# Patient Record
Sex: FEMALE | Race: WHITE | HISPANIC OR LATINO | URBAN - METROPOLITAN AREA
[De-identification: names, ages, dates, MRNs, and addresses within clinical notes are randomized per-mention and may not be internally consistent; named-entity substitution may affect disease eponyms.]

---

## 2017-06-12 ENCOUNTER — EMERGENCY (EMERGENCY)
Facility: HOSPITAL | Age: 22
LOS: 0 days | Discharge: LEFT BEFORE DISCHARGE | End: 2017-06-12

## 2017-06-28 DIAGNOSIS — R42 DIZZINESS AND GIDDINESS: ICD-10-CM

## 2017-06-28 DIAGNOSIS — K21.9 GASTRO-ESOPHAGEAL REFLUX DISEASE WITHOUT ESOPHAGITIS: ICD-10-CM

## 2017-06-28 DIAGNOSIS — J45.909 UNSPECIFIED ASTHMA, UNCOMPLICATED: ICD-10-CM

## 2017-06-28 DIAGNOSIS — R11.0 NAUSEA: ICD-10-CM

## 2018-05-02 ENCOUNTER — EMERGENCY (EMERGENCY)
Facility: HOSPITAL | Age: 23
LOS: 0 days | Discharge: HOME | End: 2018-05-02
Attending: EMERGENCY MEDICINE | Admitting: EMERGENCY MEDICINE

## 2018-05-02 VITALS
HEART RATE: 96 BPM | RESPIRATION RATE: 20 BRPM | OXYGEN SATURATION: 100 % | DIASTOLIC BLOOD PRESSURE: 94 MMHG | SYSTOLIC BLOOD PRESSURE: 144 MMHG | TEMPERATURE: 97 F

## 2018-05-02 DIAGNOSIS — R06.02 SHORTNESS OF BREATH: ICD-10-CM

## 2018-05-02 DIAGNOSIS — J45.901 UNSPECIFIED ASTHMA WITH (ACUTE) EXACERBATION: ICD-10-CM

## 2018-05-02 DIAGNOSIS — Z91.018 ALLERGY TO OTHER FOODS: ICD-10-CM

## 2018-05-02 LAB
ANION GAP SERPL CALC-SCNC: 15 MMOL/L — HIGH (ref 7–14)
APTT BLD: 31.5 SEC — SIGNIFICANT CHANGE UP (ref 27–39.2)
BUN SERPL-MCNC: 6 MG/DL — LOW (ref 10–20)
CALCIUM SERPL-MCNC: 9.6 MG/DL — SIGNIFICANT CHANGE UP (ref 8.5–10.1)
CHLORIDE SERPL-SCNC: 101 MMOL/L — SIGNIFICANT CHANGE UP (ref 98–110)
CK MB CFR SERPL CALC: 1.1 NG/ML — SIGNIFICANT CHANGE UP (ref 0.6–6.3)
CK SERPL-CCNC: 67 U/L — SIGNIFICANT CHANGE UP (ref 0–225)
CO2 SERPL-SCNC: 24 MMOL/L — SIGNIFICANT CHANGE UP (ref 17–32)
CREAT SERPL-MCNC: 0.6 MG/DL — LOW (ref 0.7–1.5)
GLUCOSE SERPL-MCNC: 116 MG/DL — HIGH (ref 70–99)
HCT VFR BLD CALC: 37.8 % — SIGNIFICANT CHANGE UP (ref 37–47)
HGB BLD-MCNC: 12.4 G/DL — SIGNIFICANT CHANGE UP (ref 12–16)
INR BLD: 1.18 RATIO — SIGNIFICANT CHANGE UP (ref 0.65–1.3)
MCHC RBC-ENTMCNC: 28.3 PG — SIGNIFICANT CHANGE UP (ref 27–31)
MCHC RBC-ENTMCNC: 32.8 G/DL — SIGNIFICANT CHANGE UP (ref 32–37)
MCV RBC AUTO: 86.3 FL — SIGNIFICANT CHANGE UP (ref 81–99)
NRBC # BLD: 0 /100 WBCS — SIGNIFICANT CHANGE UP (ref 0–0)
PLATELET # BLD AUTO: 299 K/UL — SIGNIFICANT CHANGE UP (ref 130–400)
POTASSIUM SERPL-MCNC: 3.5 MMOL/L — SIGNIFICANT CHANGE UP (ref 3.5–5)
POTASSIUM SERPL-SCNC: 3.5 MMOL/L — SIGNIFICANT CHANGE UP (ref 3.5–5)
PROTHROM AB SERPL-ACNC: 12.8 SEC — SIGNIFICANT CHANGE UP (ref 9.95–12.87)
RBC # BLD: 4.38 M/UL — SIGNIFICANT CHANGE UP (ref 4.2–5.4)
RBC # FLD: 12.2 % — SIGNIFICANT CHANGE UP (ref 11.5–14.5)
SODIUM SERPL-SCNC: 140 MMOL/L — SIGNIFICANT CHANGE UP (ref 135–146)
TROPONIN T SERPL-MCNC: <0.01 NG/ML — SIGNIFICANT CHANGE UP
WBC # BLD: 9.33 K/UL — SIGNIFICANT CHANGE UP (ref 4.8–10.8)
WBC # FLD AUTO: 9.33 K/UL — SIGNIFICANT CHANGE UP (ref 4.8–10.8)

## 2018-05-02 RX ORDER — IPRATROPIUM/ALBUTEROL SULFATE 18-103MCG
3 AEROSOL WITH ADAPTER (GRAM) INHALATION ONCE
Qty: 0 | Refills: 0 | Status: COMPLETED | OUTPATIENT
Start: 2018-05-02 | End: 2018-05-02

## 2018-05-02 RX ORDER — ALBUTEROL 90 UG/1
1 AEROSOL, METERED ORAL ONCE
Qty: 0 | Refills: 0 | Status: DISCONTINUED | OUTPATIENT
Start: 2018-05-02 | End: 2018-05-02

## 2018-05-02 RX ORDER — ALBUTEROL 90 UG/1
1 AEROSOL, METERED ORAL
Qty: 20 | Refills: 0
Start: 2018-05-02 | End: 2018-05-08

## 2018-05-02 RX ORDER — SODIUM CHLORIDE 9 MG/ML
3 INJECTION INTRAMUSCULAR; INTRAVENOUS; SUBCUTANEOUS EVERY 8 HOURS
Qty: 0 | Refills: 0 | Status: DISCONTINUED | OUTPATIENT
Start: 2018-05-02 | End: 2018-05-02

## 2018-05-02 RX ADMIN — Medication 3 MILLILITER(S): at 11:08

## 2018-05-02 RX ADMIN — Medication 3 MILLILITER(S): at 11:30

## 2018-05-02 RX ADMIN — Medication 125 MILLIGRAM(S): at 11:09

## 2018-05-02 RX ADMIN — Medication 3 MILLILITER(S): at 11:43

## 2018-05-02 RX ADMIN — SODIUM CHLORIDE 3 MILLILITER(S): 9 INJECTION INTRAMUSCULAR; INTRAVENOUS; SUBCUTANEOUS at 11:08

## 2018-05-02 NOTE — ED PROVIDER NOTE - MEDICAL DECISION MAKING DETAILS
chart done Chart finished.  21 yo woman with asthma history no prior intiubations and one ICU admission presents after she had an asthma exacerbation in [place] 2 days ago requiring intubation and she states :"cardiac arrest" but she was extubated within one day and flew back the next day.  In ED is well appearing.  Improved with nebs and corticosteroiuds.  Labs and EKG ok.  Will provide inhaler and prednisone.  Clsoe outpatient follow up and return for any new or worsening symptoms.

## 2018-05-02 NOTE — ED PROVIDER NOTE - PROGRESS NOTE DETAILS
Lungs CTA b/l; patient feeling much better. Will discharge home. Follow-up with primary and pulmonary
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
## 2018-05-02 NOTE — ED PROVIDER NOTE - CRITICAL CARE PROVIDED
additional history taking/documentation/interpretation of diagnostic studies/direct patient care (not related to procedure)/consult w/ pt's family directly relating to pts condition

## 2018-05-02 NOTE — ED PROVIDER NOTE - OBJECTIVE STATEMENT
21 y/o female with hx Asthma presents to the ED s/p "I was visiting  and my asthma was acting up. I became SOB and had shoulder pain. I was intubated and they told me I went into Cardiac arrest. I was discharged the same day. I returned to [place] last night and I still have some chest soreness and SOB with cough." no fever/ chills/ weakness/ leg swelling

## 2018-06-20 ENCOUNTER — TRANSCRIPTION ENCOUNTER (OUTPATIENT)
Age: 23
End: 2018-06-20

## 2018-12-14 PROBLEM — Z00.00 ENCOUNTER FOR PREVENTIVE HEALTH EXAMINATION: Status: ACTIVE | Noted: 2018-12-14

## 2018-12-20 ENCOUNTER — APPOINTMENT (OUTPATIENT)
Dept: ANTEPARTUM | Facility: CLINIC | Age: 23
End: 2018-12-20

## 2019-01-12 ENCOUNTER — TRANSCRIPTION ENCOUNTER (OUTPATIENT)
Age: 24
End: 2019-01-12

## 2019-09-06 ENCOUNTER — EMERGENCY (EMERGENCY)
Facility: HOSPITAL | Age: 24
LOS: 0 days | Discharge: LEFT BEFORE DISCHARGE | End: 2019-09-06
Attending: EMERGENCY MEDICINE | Admitting: EMERGENCY MEDICINE
Payer: MEDICAID

## 2019-09-06 VITALS
HEIGHT: 65 IN | OXYGEN SATURATION: 100 % | TEMPERATURE: 97 F | RESPIRATION RATE: 18 BRPM | DIASTOLIC BLOOD PRESSURE: 83 MMHG | HEART RATE: 96 BPM | SYSTOLIC BLOOD PRESSURE: 134 MMHG | WEIGHT: 233.69 LBS

## 2019-09-06 DIAGNOSIS — F17.200 NICOTINE DEPENDENCE, UNSPECIFIED, UNCOMPLICATED: ICD-10-CM

## 2019-09-06 DIAGNOSIS — R07.89 OTHER CHEST PAIN: ICD-10-CM

## 2019-09-06 PROCEDURE — 99284 EMERGENCY DEPT VISIT MOD MDM: CPT

## 2019-09-06 PROCEDURE — 93010 ELECTROCARDIOGRAM REPORT: CPT

## 2019-09-06 NOTE — ED PROVIDER NOTE - NS ED ROS FT
Constitutional: (-) fever (-) vomiting  Eyes/ENT: (-) vision changes   Cardiovascular: (+) chest pain, (-) sob  Respiratory: (-) cough, (-) shortness of breath  Gastrointestinal: (-) vomiting, (-) diarrhea, (-) abdominal pain  : (-) dysuria   Musculoskeletal: (-) back pain  Integumentary: (-) rash, (-) edema  Neurological: (-)loc  Allergic/Immunologic: (-) pruritus  Endocrine: No history of thyroid disease or diabetes.

## 2019-09-06 NOTE — ED PROVIDER NOTE - PHYSICAL EXAMINATION
CONSTITUTIONAL: Well-developed; well-nourished; in no acute distress, speaking in full sentences  SKIN: warm, dry  HEAD: Normocephalic; atraumatic  EYES: PERRL, EOMI, no conjunctival erythema  ENT: No nasal discharge; airway clear, mucous membranes moist  NECK: Supple; non tender, FROM  CARD: +S1, S2 no murmurs, gallops, or rubs. Regular rate and rhythm. radial 2+, no chest wall tenderness  RESP: No wheezes, rales or rhonchi. CTABL  ABD: soft ntnd, no rebound, no guarding, no rigidity,   EXT: moves all extremities, ambulates wo assistance No clubbing, cyanosis or edema.   NEURO: Alert, oriented, grossly unremarkable, no focal deficits, cn ii-xii grossly intact  PSYCH: Cooperative, appropriate

## 2019-09-06 NOTE — ED ADULT NURSE NOTE - OBJECTIVE STATEMENT
pt aox4 presents to ed after waking up with chest pain this am. pt states pain resolved at this time denies complaints of discomfort. denies n/v/d. denies headache / dizziness. respirations even / unlabored. pt not currently taking any medications. Pending eval from md. Will continue to monitor / assess

## 2019-09-06 NOTE — ED ADULT NURSE NOTE - NSSUHOSCREENINGYN_ED_ALL_ED
----- Message from Curtis Potter MD sent at 5/29/2019 10:08 AM CDT -----  Urine was okay. No evidence of kidney infection or bruising of kidney.   Yes - the patient is able to be screened

## 2019-09-06 NOTE — ED PROVIDER NOTE - OBJECTIVE STATEMENT
23yo f no sig pmhx presents intermittent chest pain, lasting 1 hour at a time described as feeling like there is air on her L chest which bgan around 5am. pt states this has happened multiple times in the past, no dx. no alleviating or exacerbating factors. pain is nonradiating. no family hx cardiac disease.

## 2019-10-03 ENCOUNTER — INPATIENT (INPATIENT)
Facility: HOSPITAL | Age: 24
LOS: 3 days | Discharge: HOME | End: 2019-10-07
Attending: ORTHOPAEDIC SURGERY | Admitting: ORTHOPAEDIC SURGERY
Payer: COMMERCIAL

## 2019-10-03 ENCOUNTER — EMERGENCY (EMERGENCY)
Facility: HOSPITAL | Age: 24
LOS: 0 days | Discharge: LEFT BEFORE DISCHARGE | End: 2019-10-03
Attending: EMERGENCY MEDICINE
Payer: MEDICAID

## 2019-10-03 VITALS
DIASTOLIC BLOOD PRESSURE: 103 MMHG | TEMPERATURE: 98 F | SYSTOLIC BLOOD PRESSURE: 162 MMHG | HEIGHT: 65 IN | HEART RATE: 118 BPM | RESPIRATION RATE: 30 BRPM | OXYGEN SATURATION: 99 % | WEIGHT: 220.02 LBS

## 2019-10-03 VITALS
RESPIRATION RATE: 18 BRPM | WEIGHT: 229.94 LBS | TEMPERATURE: 98 F | SYSTOLIC BLOOD PRESSURE: 133 MMHG | OXYGEN SATURATION: 99 % | DIASTOLIC BLOOD PRESSURE: 83 MMHG | HEIGHT: 65 IN | HEART RATE: 110 BPM

## 2019-10-03 VITALS
TEMPERATURE: 98 F | DIASTOLIC BLOOD PRESSURE: 80 MMHG | SYSTOLIC BLOOD PRESSURE: 145 MMHG | OXYGEN SATURATION: 99 % | RESPIRATION RATE: 18 BRPM | HEART RATE: 102 BPM

## 2019-10-03 LAB
ALBUMIN SERPL ELPH-MCNC: 4.4 G/DL — SIGNIFICANT CHANGE UP (ref 3.5–5.2)
ALBUMIN SERPL ELPH-MCNC: 5 G/DL — SIGNIFICANT CHANGE UP (ref 3.5–5.2)
ALP SERPL-CCNC: 102 U/L — SIGNIFICANT CHANGE UP (ref 30–115)
ALP SERPL-CCNC: 109 U/L — SIGNIFICANT CHANGE UP (ref 30–115)
ALT FLD-CCNC: 23 U/L — SIGNIFICANT CHANGE UP (ref 0–41)
ALT FLD-CCNC: 28 U/L — SIGNIFICANT CHANGE UP (ref 0–41)
ANION GAP SERPL CALC-SCNC: 12 MMOL/L — SIGNIFICANT CHANGE UP (ref 7–14)
ANION GAP SERPL CALC-SCNC: 16 MMOL/L — HIGH (ref 7–14)
APPEARANCE UR: CLEAR — SIGNIFICANT CHANGE UP
APTT BLD: 36 SEC — SIGNIFICANT CHANGE UP (ref 27–39.2)
AST SERPL-CCNC: 20 U/L — SIGNIFICANT CHANGE UP (ref 0–41)
AST SERPL-CCNC: 28 U/L — SIGNIFICANT CHANGE UP (ref 0–41)
BASOPHILS # BLD AUTO: 0.03 K/UL — SIGNIFICANT CHANGE UP (ref 0–0.2)
BASOPHILS # BLD AUTO: 0.06 K/UL — SIGNIFICANT CHANGE UP (ref 0–0.2)
BASOPHILS NFR BLD AUTO: 0.2 % — SIGNIFICANT CHANGE UP (ref 0–1)
BASOPHILS NFR BLD AUTO: 0.4 % — SIGNIFICANT CHANGE UP (ref 0–1)
BILIRUB DIRECT SERPL-MCNC: <0.2 MG/DL — SIGNIFICANT CHANGE UP (ref 0–0.2)
BILIRUB INDIRECT FLD-MCNC: SIGNIFICANT CHANGE UP MG/DL (ref 0.2–1.2)
BILIRUB SERPL-MCNC: <0.2 MG/DL — SIGNIFICANT CHANGE UP (ref 0.2–1.2)
BILIRUB SERPL-MCNC: <0.2 MG/DL — SIGNIFICANT CHANGE UP (ref 0.2–1.2)
BILIRUB UR-MCNC: NEGATIVE — SIGNIFICANT CHANGE UP
BLD GP AB SCN SERPL QL: SIGNIFICANT CHANGE UP
BUN SERPL-MCNC: 15 MG/DL — SIGNIFICANT CHANGE UP (ref 10–20)
BUN SERPL-MCNC: 9 MG/DL — LOW (ref 10–20)
CALCIUM SERPL-MCNC: 10.5 MG/DL — HIGH (ref 8.5–10.1)
CALCIUM SERPL-MCNC: 9.9 MG/DL — SIGNIFICANT CHANGE UP (ref 8.5–10.1)
CHLORIDE SERPL-SCNC: 100 MMOL/L — SIGNIFICANT CHANGE UP (ref 98–110)
CHLORIDE SERPL-SCNC: 99 MMOL/L — SIGNIFICANT CHANGE UP (ref 98–110)
CO2 SERPL-SCNC: 23 MMOL/L — SIGNIFICANT CHANGE UP (ref 17–32)
CO2 SERPL-SCNC: 24 MMOL/L — SIGNIFICANT CHANGE UP (ref 17–32)
COLOR SPEC: SIGNIFICANT CHANGE UP
CREAT SERPL-MCNC: 0.5 MG/DL — LOW (ref 0.7–1.5)
CREAT SERPL-MCNC: 0.8 MG/DL — SIGNIFICANT CHANGE UP (ref 0.7–1.5)
DIFF PNL FLD: NEGATIVE — SIGNIFICANT CHANGE UP
EOSINOPHIL # BLD AUTO: 0.21 K/UL — SIGNIFICANT CHANGE UP (ref 0–0.7)
EOSINOPHIL # BLD AUTO: 0.24 K/UL — SIGNIFICANT CHANGE UP (ref 0–0.7)
EOSINOPHIL NFR BLD AUTO: 1.4 % — SIGNIFICANT CHANGE UP (ref 0–8)
EOSINOPHIL NFR BLD AUTO: 1.6 % — SIGNIFICANT CHANGE UP (ref 0–8)
ETHANOL SERPL-MCNC: <10 MG/DL — SIGNIFICANT CHANGE UP
GLUCOSE SERPL-MCNC: 142 MG/DL — HIGH (ref 70–99)
GLUCOSE SERPL-MCNC: 154 MG/DL — HIGH (ref 70–99)
GLUCOSE UR QL: NEGATIVE — SIGNIFICANT CHANGE UP
HCG SERPL QL: NEGATIVE — SIGNIFICANT CHANGE UP
HCT VFR BLD CALC: 39.3 % — SIGNIFICANT CHANGE UP (ref 37–47)
HCT VFR BLD CALC: 41.2 % — SIGNIFICANT CHANGE UP (ref 37–47)
HGB BLD-MCNC: 12.6 G/DL — SIGNIFICANT CHANGE UP (ref 12–16)
HGB BLD-MCNC: 13.1 G/DL — SIGNIFICANT CHANGE UP (ref 12–16)
IMM GRANULOCYTES NFR BLD AUTO: 0.5 % — HIGH (ref 0.1–0.3)
IMM GRANULOCYTES NFR BLD AUTO: 0.7 % — HIGH (ref 0.1–0.3)
INR BLD: 0.96 RATIO — SIGNIFICANT CHANGE UP (ref 0.65–1.3)
KETONES UR-MCNC: NEGATIVE — SIGNIFICANT CHANGE UP
LACTATE SERPL-SCNC: 3.2 MMOL/L — HIGH (ref 0.5–2.2)
LEUKOCYTE ESTERASE UR-ACNC: NEGATIVE — SIGNIFICANT CHANGE UP
LIDOCAIN IGE QN: 33 U/L — SIGNIFICANT CHANGE UP (ref 7–60)
LIDOCAIN IGE QN: 40 U/L — SIGNIFICANT CHANGE UP (ref 7–60)
LYMPHOCYTES # BLD AUTO: 2.94 K/UL — SIGNIFICANT CHANGE UP (ref 1.2–3.4)
LYMPHOCYTES # BLD AUTO: 21.8 % — SIGNIFICANT CHANGE UP (ref 20.5–51.1)
LYMPHOCYTES # BLD AUTO: 30 % — SIGNIFICANT CHANGE UP (ref 20.5–51.1)
LYMPHOCYTES # BLD AUTO: 4.97 K/UL — HIGH (ref 1.2–3.4)
MCHC RBC-ENTMCNC: 26.5 PG — LOW (ref 27–31)
MCHC RBC-ENTMCNC: 26.6 PG — LOW (ref 27–31)
MCHC RBC-ENTMCNC: 31.8 G/DL — LOW (ref 32–37)
MCHC RBC-ENTMCNC: 32.1 G/DL — SIGNIFICANT CHANGE UP (ref 32–37)
MCV RBC AUTO: 82.9 FL — SIGNIFICANT CHANGE UP (ref 81–99)
MCV RBC AUTO: 83.2 FL — SIGNIFICANT CHANGE UP (ref 81–99)
MONOCYTES # BLD AUTO: 0.68 K/UL — HIGH (ref 0.1–0.6)
MONOCYTES # BLD AUTO: 0.85 K/UL — HIGH (ref 0.1–0.6)
MONOCYTES NFR BLD AUTO: 5 % — SIGNIFICANT CHANGE UP (ref 1.7–9.3)
MONOCYTES NFR BLD AUTO: 5.1 % — SIGNIFICANT CHANGE UP (ref 1.7–9.3)
NEUTROPHILS # BLD AUTO: 10.36 K/UL — HIGH (ref 1.4–6.5)
NEUTROPHILS # BLD AUTO: 9.58 K/UL — HIGH (ref 1.4–6.5)
NEUTROPHILS NFR BLD AUTO: 62.4 % — SIGNIFICANT CHANGE UP (ref 42.2–75.2)
NEUTROPHILS NFR BLD AUTO: 70.9 % — SIGNIFICANT CHANGE UP (ref 42.2–75.2)
NITRITE UR-MCNC: NEGATIVE — SIGNIFICANT CHANGE UP
NRBC # BLD: 0 /100 WBCS — SIGNIFICANT CHANGE UP (ref 0–0)
NRBC # BLD: 0 /100 WBCS — SIGNIFICANT CHANGE UP (ref 0–0)
PH UR: 6 — SIGNIFICANT CHANGE UP (ref 5–8)
PLATELET # BLD AUTO: 400 K/UL — SIGNIFICANT CHANGE UP (ref 130–400)
PLATELET # BLD AUTO: 493 K/UL — HIGH (ref 130–400)
POTASSIUM SERPL-MCNC: 4.5 MMOL/L — SIGNIFICANT CHANGE UP (ref 3.5–5)
POTASSIUM SERPL-MCNC: 4.6 MMOL/L — SIGNIFICANT CHANGE UP (ref 3.5–5)
POTASSIUM SERPL-SCNC: 4.5 MMOL/L — SIGNIFICANT CHANGE UP (ref 3.5–5)
POTASSIUM SERPL-SCNC: 4.6 MMOL/L — SIGNIFICANT CHANGE UP (ref 3.5–5)
PROT SERPL-MCNC: 7.4 G/DL — SIGNIFICANT CHANGE UP (ref 6–8)
PROT SERPL-MCNC: 8.3 G/DL — HIGH (ref 6–8)
PROT UR-MCNC: NEGATIVE — SIGNIFICANT CHANGE UP
PROTHROM AB SERPL-ACNC: 11 SEC — SIGNIFICANT CHANGE UP (ref 9.95–12.87)
RBC # BLD: 4.74 M/UL — SIGNIFICANT CHANGE UP (ref 4.2–5.4)
RBC # BLD: 4.95 M/UL — SIGNIFICANT CHANGE UP (ref 4.2–5.4)
RBC # FLD: 12.7 % — SIGNIFICANT CHANGE UP (ref 11.5–14.5)
RBC # FLD: 12.7 % — SIGNIFICANT CHANGE UP (ref 11.5–14.5)
SODIUM SERPL-SCNC: 135 MMOL/L — SIGNIFICANT CHANGE UP (ref 135–146)
SODIUM SERPL-SCNC: 139 MMOL/L — SIGNIFICANT CHANGE UP (ref 135–146)
SP GR SPEC: 1.01 — SIGNIFICANT CHANGE UP (ref 1.01–1.02)
UROBILINOGEN FLD QL: SIGNIFICANT CHANGE UP
WBC # BLD: 13.51 K/UL — HIGH (ref 4.8–10.8)
WBC # BLD: 16.59 K/UL — HIGH (ref 4.8–10.8)
WBC # FLD AUTO: 13.51 K/UL — HIGH (ref 4.8–10.8)
WBC # FLD AUTO: 16.59 K/UL — HIGH (ref 4.8–10.8)

## 2019-10-03 PROCEDURE — 73630 X-RAY EXAM OF FOOT: CPT | Mod: 26,RT

## 2019-10-03 PROCEDURE — 71045 X-RAY EXAM CHEST 1 VIEW: CPT | Mod: 26

## 2019-10-03 PROCEDURE — 99222 1ST HOSP IP/OBS MODERATE 55: CPT

## 2019-10-03 PROCEDURE — 93010 ELECTROCARDIOGRAM REPORT: CPT | Mod: 76

## 2019-10-03 PROCEDURE — 73610 X-RAY EXAM OF ANKLE: CPT | Mod: 26,RT

## 2019-10-03 PROCEDURE — 73590 X-RAY EXAM OF LOWER LEG: CPT | Mod: 26,RT

## 2019-10-03 PROCEDURE — 72170 X-RAY EXAM OF PELVIS: CPT | Mod: 26

## 2019-10-03 PROCEDURE — 93010 ELECTROCARDIOGRAM REPORT: CPT

## 2019-10-03 PROCEDURE — 73700 CT LOWER EXTREMITY W/O DYE: CPT | Mod: 26,RT

## 2019-10-03 PROCEDURE — 99291 CRITICAL CARE FIRST HOUR: CPT

## 2019-10-03 PROCEDURE — 76830 TRANSVAGINAL US NON-OB: CPT | Mod: 26

## 2019-10-03 PROCEDURE — 99284 EMERGENCY DEPT VISIT MOD MDM: CPT

## 2019-10-03 RX ORDER — HEPARIN SODIUM 5000 [USP'U]/ML
5000 INJECTION INTRAVENOUS; SUBCUTANEOUS EVERY 8 HOURS
Refills: 0 | Status: DISCONTINUED | OUTPATIENT
Start: 2019-10-03 | End: 2019-10-04

## 2019-10-03 RX ORDER — OXYCODONE HYDROCHLORIDE 5 MG/1
5 TABLET ORAL EVERY 4 HOURS
Refills: 0 | Status: DISCONTINUED | OUTPATIENT
Start: 2019-10-03 | End: 2019-10-04

## 2019-10-03 RX ORDER — MORPHINE SULFATE 50 MG/1
2 CAPSULE, EXTENDED RELEASE ORAL EVERY 4 HOURS
Refills: 0 | Status: DISCONTINUED | OUTPATIENT
Start: 2019-10-03 | End: 2019-10-04

## 2019-10-03 RX ORDER — HYDROMORPHONE HYDROCHLORIDE 2 MG/ML
1 INJECTION INTRAMUSCULAR; INTRAVENOUS; SUBCUTANEOUS ONCE
Refills: 0 | Status: DISCONTINUED | OUTPATIENT
Start: 2019-10-03 | End: 2019-10-03

## 2019-10-03 RX ORDER — PANTOPRAZOLE SODIUM 20 MG/1
40 TABLET, DELAYED RELEASE ORAL
Refills: 0 | Status: DISCONTINUED | OUTPATIENT
Start: 2019-10-03 | End: 2019-10-04

## 2019-10-03 RX ORDER — MORPHINE SULFATE 50 MG/1
6 CAPSULE, EXTENDED RELEASE ORAL ONCE
Refills: 0 | Status: DISCONTINUED | OUTPATIENT
Start: 2019-10-03 | End: 2019-10-03

## 2019-10-03 RX ORDER — MORPHINE SULFATE 50 MG/1
2 CAPSULE, EXTENDED RELEASE ORAL ONCE
Refills: 0 | Status: DISCONTINUED | OUTPATIENT
Start: 2019-10-03 | End: 2019-10-03

## 2019-10-03 RX ORDER — KETOROLAC TROMETHAMINE 30 MG/ML
30 SYRINGE (ML) INJECTION EVERY 6 HOURS
Refills: 0 | Status: DISCONTINUED | OUTPATIENT
Start: 2019-10-03 | End: 2019-10-04

## 2019-10-03 RX ORDER — SODIUM CHLORIDE 9 MG/ML
1000 INJECTION, SOLUTION INTRAVENOUS ONCE
Refills: 0 | Status: COMPLETED | OUTPATIENT
Start: 2019-10-03 | End: 2019-10-03

## 2019-10-03 RX ORDER — SODIUM CHLORIDE 9 MG/ML
1000 INJECTION, SOLUTION INTRAVENOUS
Refills: 0 | Status: DISCONTINUED | OUTPATIENT
Start: 2019-10-03 | End: 2019-10-04

## 2019-10-03 RX ORDER — TETANUS TOXOID, REDUCED DIPHTHERIA TOXOID AND ACELLULAR PERTUSSIS VACCINE, ADSORBED 5; 2.5; 8; 8; 2.5 [IU]/.5ML; [IU]/.5ML; UG/.5ML; UG/.5ML; UG/.5ML
0.5 SUSPENSION INTRAMUSCULAR ONCE
Refills: 0 | Status: COMPLETED | OUTPATIENT
Start: 2019-10-03 | End: 2019-10-03

## 2019-10-03 RX ORDER — AMPICILLIN SODIUM AND SULBACTAM SODIUM 250; 125 MG/ML; MG/ML
3 INJECTION, POWDER, FOR SUSPENSION INTRAMUSCULAR; INTRAVENOUS ONCE
Refills: 0 | Status: COMPLETED | OUTPATIENT
Start: 2019-10-03 | End: 2019-10-03

## 2019-10-03 RX ORDER — ONDANSETRON 8 MG/1
4 TABLET, FILM COATED ORAL ONCE
Refills: 0 | Status: COMPLETED | OUTPATIENT
Start: 2019-10-03 | End: 2019-10-03

## 2019-10-03 RX ORDER — SODIUM CHLORIDE 9 MG/ML
1000 INJECTION INTRAMUSCULAR; INTRAVENOUS; SUBCUTANEOUS ONCE
Refills: 0 | Status: COMPLETED | OUTPATIENT
Start: 2019-10-03 | End: 2019-10-03

## 2019-10-03 RX ORDER — CEFAZOLIN SODIUM 1 G
2000 VIAL (EA) INJECTION EVERY 8 HOURS
Refills: 0 | Status: DISCONTINUED | OUTPATIENT
Start: 2019-10-03 | End: 2019-10-04

## 2019-10-03 RX ORDER — CHLORHEXIDINE GLUCONATE 213 G/1000ML
1 SOLUTION TOPICAL
Refills: 0 | Status: DISCONTINUED | OUTPATIENT
Start: 2019-10-03 | End: 2019-10-04

## 2019-10-03 RX ADMIN — AMPICILLIN SODIUM AND SULBACTAM SODIUM 3 GRAM(S): 250; 125 INJECTION, POWDER, FOR SUSPENSION INTRAMUSCULAR; INTRAVENOUS at 21:15

## 2019-10-03 RX ADMIN — HYDROMORPHONE HYDROCHLORIDE 1 MILLIGRAM(S): 2 INJECTION INTRAMUSCULAR; INTRAVENOUS; SUBCUTANEOUS at 22:17

## 2019-10-03 RX ADMIN — MORPHINE SULFATE 6 MILLIGRAM(S): 50 CAPSULE, EXTENDED RELEASE ORAL at 18:43

## 2019-10-03 RX ADMIN — HYDROMORPHONE HYDROCHLORIDE 1 MILLIGRAM(S): 2 INJECTION INTRAMUSCULAR; INTRAVENOUS; SUBCUTANEOUS at 21:25

## 2019-10-03 RX ADMIN — MORPHINE SULFATE 6 MILLIGRAM(S): 50 CAPSULE, EXTENDED RELEASE ORAL at 20:15

## 2019-10-03 RX ADMIN — HYDROMORPHONE HYDROCHLORIDE 1 MILLIGRAM(S): 2 INJECTION INTRAMUSCULAR; INTRAVENOUS; SUBCUTANEOUS at 22:50

## 2019-10-03 RX ADMIN — MORPHINE SULFATE 2 MILLIGRAM(S): 50 CAPSULE, EXTENDED RELEASE ORAL at 19:02

## 2019-10-03 RX ADMIN — SODIUM CHLORIDE 1000 MILLILITER(S): 9 INJECTION, SOLUTION INTRAVENOUS at 22:30

## 2019-10-03 RX ADMIN — SODIUM CHLORIDE 1000 MILLILITER(S): 9 INJECTION INTRAMUSCULAR; INTRAVENOUS; SUBCUTANEOUS at 14:34

## 2019-10-03 RX ADMIN — AMPICILLIN SODIUM AND SULBACTAM SODIUM 200 GRAM(S): 250; 125 INJECTION, POWDER, FOR SUSPENSION INTRAMUSCULAR; INTRAVENOUS at 20:07

## 2019-10-03 RX ADMIN — ONDANSETRON 4 MILLIGRAM(S): 8 TABLET, FILM COATED ORAL at 22:40

## 2019-10-03 NOTE — ED ADULT NURSE NOTE - NSIMPLEMENTINTERV_GEN_ALL_ED
Implemented All Fall Risk Interventions:  [place] to call system. Call bell, personal items and telephone within reach. Instruct patient to call for assistance. Room bathroom lighting operational. Non-slip footwear when patient is off stretcher. Physically safe environment: no spills, clutter or unnecessary equipment. Stretcher in lowest position, wheels locked, appropriate side rails in place. Provide visual cue, wrist band, yellow gown, etc. Monitor gait and stability. Monitor for mental status changes and reorient to person, place, and time. Review medications for side effects contributing to fall risk. Reinforce activity limits and safety measures with patient and family.
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
## 2019-10-03 NOTE — ED PROVIDER NOTE - NS ED ROS FT
General: No fever, chills, or weakness.  Eyes:  No visual changes, eye pain or discharge.  ENMT:  No hearing changes, pain, no sore throat or runny nose, no difficulty swallowing  Cardiac:  No chest pain, SOB or edema. No chest pain with exertion.  Respiratory:  No cough or respiratory distress. No hemoptysis. No history of asthma or RAD.  GI:  No nausea, vomiting, diarrhea or abdominal pain.  :  No dysuria, frequency or burning.  MS:  right foot injury medial foot. pain right foot.  Neuro:  numbness/parasthesias right foot. no weakness right foot.  Skin:  Bleeding right foot. no other rashes.
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
## 2019-10-03 NOTE — ED PROVIDER NOTE - CLINICAL SUMMARY MEDICAL DECISION MAKING FREE TEXT BOX
pt presented to the ED stating she was 2 weeks late for her menstrual period and she was having over one week of pelvic cramping.  pt with occasional nausea and vomiting.  pt reported that last week she had syncopal episode after feeling weak.  It occurred sitting down, no injury, no symptoms since, no cp, no sob.  WOrk up negative for anything acute.  abd soft, nontender, benign.  Pt had eloped from the ED prior to getting results and prior to being re evaluated.  I had called the pt on the phone and she reported that she had to leave the ED for personal reason.  Pt reported on the phone that she felt much better and she was going to follow up with gyn as outpatient.  pt left / eloped prior to getting any dc instructions and prior to being able to be re evaluated by me.

## 2019-10-03 NOTE — ED PROVIDER NOTE - ATTENDING CONTRIBUTION TO CARE
23 yo f presents with being 2 weeks late for menstrual period and complains of 1.5 weeks of pelvic cramping.  no other abd pain.  occasional nausea/vomiting.  no fevers, no chills, no back pain, no cp, no sob, no headache.  pt reports last week she was feeling weak and had n/v and had syncopal episode while sitting down.  no other syncope.  no other symptoms.    awake, alert.  neck supple.  abd soft, nontender.  NO RLQ tenderness.  no ruq tenderness.  mmm.  pt breathing comfortably.  eomi.  pt moving all 4 ext.  motor/sensation intact in all 4 ext.    p:  labs, ekg, supportive care, sono

## 2019-10-03 NOTE — H&P ADULT - NSHPPHYSICALEXAM_GEN_ALL_CORE
Physical Exam:  PHYSICAL EXAM:  General: NAD  Cardiac: RRR S1, S2, no Murmurs, rubs or gallops  Respiratory: CTAB, normal respiratory effort, breath sounds equal BL, no wheeze, rhonchi or crackles  Abdomen: Soft, non-distended, non-tender, no rebound, no guarding. +BS.  Musculoskeletal:  RLE: + pokehole over medial calcaneus, Large swelling and ecchymosis of heel, no obvious skin tenting  Compartments soft and compressible.

## 2019-10-03 NOTE — ED PROVIDER NOTE - CRITICAL CARE PROVIDED
consult w/ pt's family directly relating to pts condition/documentation/interpretation of diagnostic studies/direct patient care (not related to procedure)/additional history taking/consultation with other physicians
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
## 2019-10-03 NOTE — ED PROVIDER NOTE - PHYSICAL EXAMINATION
Constitutional: Well developed, well nourished. NAD. Good general hygiene  Head: Atraumatic.  Eyes: PERRLA. EOMI without discomfort.   ENT: No nasal discharge. Mucous membranes moist.  Neck: Supple. Painless ROM.  Cardiovascular: Regular rhythm. Regular rate. Normal S1 and S2. No murmurs. 2+ pulses in all extremities.   Pulmonary: Normal respiratory rate and effort. Lungs clear to auscultation bilaterally. No wheezing, rales, or rhonchi. Bilateral, equal lung expansion.   Abdominal: Soft. Nondistended. Nontender. No rebound or guarding.   Extremities: Obvious deformity of right foot. Right medial calcaneous puncture wound.   Skin: Active slow bleeding from wound. Some white colored drainage from wound.  Neuro: AAOx3. Sensation reduced right medial foot compared to left.

## 2019-10-03 NOTE — H&P ADULT - NSHPLABSRESULTS_GEN_ALL_CORE
LAB/STUDIES:             13.1   16.59 )-----------( 493      ( 03 Oct 2019 18:44 )             41.2     10    139  |  100  |  15  ----------------------------<  154<H>  4.5   |  23  |  0.8    Ca    10.5<H>      03 Oct 2019 18:44    TPro  8.3<H>  /  Alb  5.0  /  TBili  <0.2  /  DBili  x   /  AST  28  /  ALT  28  /  AlkPhos  109  10-03    PT/INR - ( 03 Oct 2019 18:44 )   PT: 11.00 sec;   INR: 0.96 ratio      PTT - ( 03 Oct 2019 18:44 )  PTT:36.0 sec  LIVER FUNCTIONS - ( 03 Oct 2019 18:44 )  Alb: 5.0 g/dL / Pro: 8.3 g/dL / ALK PHOS: 109 U/L / ALT: 28 U/L / AST: 28 U/L / GGT: x           Urinalysis Basic - ( 03 Oct 2019 14:02 )    Color: Light Yellow / Appearance: Clear / S.012 / pH: x  Gluc: x / Ketone: Negative  / Bili: Negative / Urobili: <2 mg/dL   Blood: x / Protein: Negative / Nitrite: Negative   Leuk Esterase: Negative / RBC: x / WBC x   Sq Epi: x / Non Sq Epi: x / Bacteria: x    IMAGING:   right foot XR: comminuted tongue type calcaneus fracture.

## 2019-10-03 NOTE — ED PROVIDER NOTE - OBJECTIVE STATEMENT
25 yo F A1, presents for evaluation of pelvic pain, onset today, with no associated symptoms. Pt states that she has not had her LMP for two weeks, it is late, last time she had it was in . NO fever, no chills, no headache, no nausea, no vomiting, no chest pain, no back pain, no vaginal bleeding, no vaginal discharge. Pt states that she was worried she missed her period and could be pregnant. PT denies any other symptoms.
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
## 2019-10-03 NOTE — ED PROVIDER NOTE - PROGRESS NOTE DETAILS
Pt in no distress, abd soft non tender Pt eloped to the ED pt ella.  I called pt .  pt told me she had to leave the ED for personal reason.  pt's IV had already been removed prior to her leaving.  pt was feeling better.  pt aware of follow up instructions to follow up with pmd and gyn

## 2019-10-03 NOTE — ED ADULT NURSE NOTE - OBJECTIVE STATEMENT
Pt presented with c/o lower abd discomfort. States she is two weeks late on her period. Endorses lower abd cramping, n/v. Alert and oriented x3. No obvious deformities or trauma noted.

## 2019-10-03 NOTE — CONSULT NOTE ADULT - ATTENDING COMMENTS
Pt seen and examined.  Agree with resident exam and plan.  -washout in ED, IV abx, splint, NWB RLE, elevate  -pt needs CT scan R calcaneus to eval for surgical planning.  -will monitor soft tissues  -NPO after MN in case urgent management required.

## 2019-10-04 DIAGNOSIS — Z02.9 ENCOUNTER FOR ADMINISTRATIVE EXAMINATIONS, UNSPECIFIED: ICD-10-CM

## 2019-10-04 LAB
ANION GAP SERPL CALC-SCNC: 12 MMOL/L — SIGNIFICANT CHANGE UP (ref 7–14)
ANION GAP SERPL CALC-SCNC: 15 MMOL/L — HIGH (ref 7–14)
APPEARANCE UR: CLEAR — SIGNIFICANT CHANGE UP
APTT BLD: 40.1 SEC — HIGH (ref 27–39.2)
BILIRUB UR-MCNC: NEGATIVE — SIGNIFICANT CHANGE UP
BUN SERPL-MCNC: 10 MG/DL — SIGNIFICANT CHANGE UP (ref 10–20)
BUN SERPL-MCNC: 8 MG/DL — LOW (ref 10–20)
CALCIUM SERPL-MCNC: 9.6 MG/DL — SIGNIFICANT CHANGE UP (ref 8.5–10.1)
CALCIUM SERPL-MCNC: 9.7 MG/DL — SIGNIFICANT CHANGE UP (ref 8.5–10.1)
CHLORIDE SERPL-SCNC: 102 MMOL/L — SIGNIFICANT CHANGE UP (ref 98–110)
CHLORIDE SERPL-SCNC: 104 MMOL/L — SIGNIFICANT CHANGE UP (ref 98–110)
CO2 SERPL-SCNC: 22 MMOL/L — SIGNIFICANT CHANGE UP (ref 17–32)
CO2 SERPL-SCNC: 25 MMOL/L — SIGNIFICANT CHANGE UP (ref 17–32)
COLOR SPEC: COLORLESS — SIGNIFICANT CHANGE UP
CREAT SERPL-MCNC: 0.5 MG/DL — LOW (ref 0.7–1.5)
CREAT SERPL-MCNC: 0.6 MG/DL — LOW (ref 0.7–1.5)
DIFF PNL FLD: NEGATIVE — SIGNIFICANT CHANGE UP
GLUCOSE SERPL-MCNC: 121 MG/DL — HIGH (ref 70–99)
GLUCOSE SERPL-MCNC: 158 MG/DL — HIGH (ref 70–99)
GLUCOSE UR QL: NEGATIVE — SIGNIFICANT CHANGE UP
HCG UR QL: NEGATIVE — SIGNIFICANT CHANGE UP
HCT VFR BLD CALC: 35.7 % — LOW (ref 37–47)
HCT VFR BLD CALC: 37.5 % — SIGNIFICANT CHANGE UP (ref 37–47)
HGB BLD-MCNC: 11.4 G/DL — LOW (ref 12–16)
HGB BLD-MCNC: 11.9 G/DL — LOW (ref 12–16)
INR BLD: 1.04 RATIO — SIGNIFICANT CHANGE UP (ref 0.65–1.3)
KETONES UR-MCNC: SIGNIFICANT CHANGE UP
LEUKOCYTE ESTERASE UR-ACNC: NEGATIVE — SIGNIFICANT CHANGE UP
MAGNESIUM SERPL-MCNC: 2 MG/DL — SIGNIFICANT CHANGE UP (ref 1.8–2.4)
MAGNESIUM SERPL-MCNC: 2 MG/DL — SIGNIFICANT CHANGE UP (ref 1.8–2.4)
MCHC RBC-ENTMCNC: 26.6 PG — LOW (ref 27–31)
MCHC RBC-ENTMCNC: 26.7 PG — LOW (ref 27–31)
MCHC RBC-ENTMCNC: 31.7 G/DL — LOW (ref 32–37)
MCHC RBC-ENTMCNC: 31.9 G/DL — LOW (ref 32–37)
MCV RBC AUTO: 83.6 FL — SIGNIFICANT CHANGE UP (ref 81–99)
MCV RBC AUTO: 83.7 FL — SIGNIFICANT CHANGE UP (ref 81–99)
NITRITE UR-MCNC: NEGATIVE — SIGNIFICANT CHANGE UP
NRBC # BLD: 0 /100 WBCS — SIGNIFICANT CHANGE UP (ref 0–0)
NRBC # BLD: 0 /100 WBCS — SIGNIFICANT CHANGE UP (ref 0–0)
PH UR: 6.5 — SIGNIFICANT CHANGE UP (ref 5–8)
PHOSPHATE SERPL-MCNC: 3 MG/DL — SIGNIFICANT CHANGE UP (ref 2.1–4.9)
PHOSPHATE SERPL-MCNC: 4.2 MG/DL — SIGNIFICANT CHANGE UP (ref 2.1–4.9)
PLATELET # BLD AUTO: 378 K/UL — SIGNIFICANT CHANGE UP (ref 130–400)
PLATELET # BLD AUTO: 407 K/UL — HIGH (ref 130–400)
POTASSIUM SERPL-MCNC: 4.4 MMOL/L — SIGNIFICANT CHANGE UP (ref 3.5–5)
POTASSIUM SERPL-MCNC: 4.5 MMOL/L — SIGNIFICANT CHANGE UP (ref 3.5–5)
POTASSIUM SERPL-SCNC: 4.4 MMOL/L — SIGNIFICANT CHANGE UP (ref 3.5–5)
POTASSIUM SERPL-SCNC: 4.5 MMOL/L — SIGNIFICANT CHANGE UP (ref 3.5–5)
PROT UR-MCNC: NEGATIVE — SIGNIFICANT CHANGE UP
PROTHROM AB SERPL-ACNC: 11.9 SEC — SIGNIFICANT CHANGE UP (ref 9.95–12.87)
RBC # BLD: 4.27 M/UL — SIGNIFICANT CHANGE UP (ref 4.2–5.4)
RBC # BLD: 4.48 M/UL — SIGNIFICANT CHANGE UP (ref 4.2–5.4)
RBC # FLD: 13 % — SIGNIFICANT CHANGE UP (ref 11.5–14.5)
RBC # FLD: 13 % — SIGNIFICANT CHANGE UP (ref 11.5–14.5)
SODIUM SERPL-SCNC: 139 MMOL/L — SIGNIFICANT CHANGE UP (ref 135–146)
SODIUM SERPL-SCNC: 141 MMOL/L — SIGNIFICANT CHANGE UP (ref 135–146)
SP GR SPEC: 1.01 — SIGNIFICANT CHANGE UP (ref 1.01–1.02)
UROBILINOGEN FLD QL: SIGNIFICANT CHANGE UP
WBC # BLD: 16.08 K/UL — HIGH (ref 4.8–10.8)
WBC # BLD: 19.9 K/UL — HIGH (ref 4.8–10.8)
WBC # FLD AUTO: 16.08 K/UL — HIGH (ref 4.8–10.8)
WBC # FLD AUTO: 19.9 K/UL — HIGH (ref 4.8–10.8)

## 2019-10-04 PROCEDURE — 99232 SBSQ HOSP IP/OBS MODERATE 35: CPT

## 2019-10-04 PROCEDURE — 93010 ELECTROCARDIOGRAM REPORT: CPT

## 2019-10-04 PROCEDURE — 93010 ELECTROCARDIOGRAM REPORT: CPT | Mod: 77

## 2019-10-04 RX ORDER — SENNA PLUS 8.6 MG/1
2 TABLET ORAL AT BEDTIME
Refills: 0 | Status: DISCONTINUED | OUTPATIENT
Start: 2019-10-04 | End: 2019-10-07

## 2019-10-04 RX ORDER — DOCUSATE SODIUM 100 MG
100 CAPSULE ORAL
Refills: 0 | Status: DISCONTINUED | OUTPATIENT
Start: 2019-10-04 | End: 2019-10-07

## 2019-10-04 RX ORDER — SODIUM CHLORIDE 9 MG/ML
1000 INJECTION INTRAMUSCULAR; INTRAVENOUS; SUBCUTANEOUS
Refills: 0 | Status: DISCONTINUED | OUTPATIENT
Start: 2019-10-04 | End: 2019-10-06

## 2019-10-04 RX ORDER — MORPHINE SULFATE 50 MG/1
4 CAPSULE, EXTENDED RELEASE ORAL EVERY 4 HOURS
Refills: 0 | Status: DISCONTINUED | OUTPATIENT
Start: 2019-10-04 | End: 2019-10-04

## 2019-10-04 RX ORDER — ENOXAPARIN SODIUM 100 MG/ML
30 INJECTION SUBCUTANEOUS
Refills: 0 | Status: DISCONTINUED | OUTPATIENT
Start: 2019-10-04 | End: 2019-10-07

## 2019-10-04 RX ORDER — ACETAMINOPHEN 500 MG
650 TABLET ORAL EVERY 6 HOURS
Refills: 0 | Status: DISCONTINUED | OUTPATIENT
Start: 2019-10-04 | End: 2019-10-07

## 2019-10-04 RX ORDER — IPRATROPIUM/ALBUTEROL SULFATE 18-103MCG
3 AEROSOL WITH ADAPTER (GRAM) INHALATION EVERY 6 HOURS
Refills: 0 | Status: DISCONTINUED | OUTPATIENT
Start: 2019-10-04 | End: 2019-10-07

## 2019-10-04 RX ORDER — OXYCODONE HYDROCHLORIDE 5 MG/1
10 TABLET ORAL EVERY 4 HOURS
Refills: 0 | Status: DISCONTINUED | OUTPATIENT
Start: 2019-10-04 | End: 2019-10-07

## 2019-10-04 RX ORDER — HYDROMORPHONE HYDROCHLORIDE 2 MG/ML
0.5 INJECTION INTRAMUSCULAR; INTRAVENOUS; SUBCUTANEOUS
Refills: 0 | Status: DISCONTINUED | OUTPATIENT
Start: 2019-10-04 | End: 2019-10-04

## 2019-10-04 RX ORDER — CEFAZOLIN SODIUM 1 G
2000 VIAL (EA) INJECTION EVERY 8 HOURS
Refills: 0 | Status: COMPLETED | OUTPATIENT
Start: 2019-10-04 | End: 2019-10-05

## 2019-10-04 RX ORDER — SODIUM CHLORIDE 9 MG/ML
1000 INJECTION, SOLUTION INTRAVENOUS
Refills: 0 | Status: DISCONTINUED | OUTPATIENT
Start: 2019-10-04 | End: 2019-10-04

## 2019-10-04 RX ORDER — TRAMADOL HYDROCHLORIDE 50 MG/1
50 TABLET ORAL EVERY 4 HOURS
Refills: 0 | Status: DISCONTINUED | OUTPATIENT
Start: 2019-10-04 | End: 2019-10-07

## 2019-10-04 RX ADMIN — MORPHINE SULFATE 6 MILLIGRAM(S): 50 CAPSULE, EXTENDED RELEASE ORAL at 00:50

## 2019-10-04 RX ADMIN — PANTOPRAZOLE SODIUM 40 MILLIGRAM(S): 20 TABLET, DELAYED RELEASE ORAL at 05:17

## 2019-10-04 RX ADMIN — Medication 30 MILLIGRAM(S): at 00:50

## 2019-10-04 RX ADMIN — ENOXAPARIN SODIUM 30 MILLIGRAM(S): 100 INJECTION SUBCUTANEOUS at 17:15

## 2019-10-04 RX ADMIN — SODIUM CHLORIDE 100 MILLILITER(S): 9 INJECTION, SOLUTION INTRAVENOUS at 03:16

## 2019-10-04 RX ADMIN — OXYCODONE HYDROCHLORIDE 10 MILLIGRAM(S): 5 TABLET ORAL at 23:45

## 2019-10-04 RX ADMIN — Medication 100 MILLIGRAM(S): at 18:14

## 2019-10-04 RX ADMIN — MORPHINE SULFATE 2 MILLIGRAM(S): 50 CAPSULE, EXTENDED RELEASE ORAL at 07:49

## 2019-10-04 RX ADMIN — OXYCODONE HYDROCHLORIDE 5 MILLIGRAM(S): 5 TABLET ORAL at 01:57

## 2019-10-04 RX ADMIN — SENNA PLUS 2 TABLET(S): 8.6 TABLET ORAL at 21:42

## 2019-10-04 RX ADMIN — Medication 30 MILLIGRAM(S): at 05:20

## 2019-10-04 RX ADMIN — Medication 100 MILLIGRAM(S): at 17:15

## 2019-10-04 RX ADMIN — Medication 650 MILLIGRAM(S): at 17:15

## 2019-10-04 RX ADMIN — Medication 650 MILLIGRAM(S): at 23:18

## 2019-10-04 RX ADMIN — Medication 30 MILLIGRAM(S): at 00:16

## 2019-10-04 RX ADMIN — HYDROMORPHONE HYDROCHLORIDE 1 MILLIGRAM(S): 2 INJECTION INTRAMUSCULAR; INTRAVENOUS; SUBCUTANEOUS at 00:50

## 2019-10-04 RX ADMIN — TRAMADOL HYDROCHLORIDE 50 MILLIGRAM(S): 50 TABLET ORAL at 21:23

## 2019-10-04 RX ADMIN — MORPHINE SULFATE 4 MILLIGRAM(S): 50 CAPSULE, EXTENDED RELEASE ORAL at 20:08

## 2019-10-04 RX ADMIN — HEPARIN SODIUM 5000 UNIT(S): 5000 INJECTION INTRAVENOUS; SUBCUTANEOUS at 05:20

## 2019-10-04 RX ADMIN — MORPHINE SULFATE 2 MILLIGRAM(S): 50 CAPSULE, EXTENDED RELEASE ORAL at 07:19

## 2019-10-04 RX ADMIN — OXYCODONE HYDROCHLORIDE 5 MILLIGRAM(S): 5 TABLET ORAL at 09:15

## 2019-10-04 RX ADMIN — MORPHINE SULFATE 2 MILLIGRAM(S): 50 CAPSULE, EXTENDED RELEASE ORAL at 00:50

## 2019-10-04 RX ADMIN — MORPHINE SULFATE 2 MILLIGRAM(S): 50 CAPSULE, EXTENDED RELEASE ORAL at 03:15

## 2019-10-04 RX ADMIN — TETANUS TOXOID, REDUCED DIPHTHERIA TOXOID AND ACELLULAR PERTUSSIS VACCINE, ADSORBED 0.5 MILLILITER(S): 5; 2.5; 8; 8; 2.5 SUSPENSION INTRAMUSCULAR at 00:15

## 2019-10-04 RX ADMIN — SODIUM CHLORIDE 1000 MILLILITER(S): 9 INJECTION, SOLUTION INTRAVENOUS at 01:50

## 2019-10-04 RX ADMIN — OXYCODONE HYDROCHLORIDE 5 MILLIGRAM(S): 5 TABLET ORAL at 08:45

## 2019-10-04 RX ADMIN — Medication 100 MILLIGRAM(S): at 08:56

## 2019-10-04 RX ADMIN — Medication 1 TABLET(S): at 21:41

## 2019-10-04 NOTE — BRIEF OPERATIVE NOTE - OPERATION/FINDINGS
open medial wound; no gross contamination;  CHAIM open fracture classification: Skin1; Muscle 1; Contamination 1; bone loss 1; post op Abx per protocol; NWB x 6-10 weeks  Dict:  Implants: 3 x 7.3mm cannulated screws open medial wound; no gross contamination;  CHAIM open fracture classification: Skin1; Muscle 1; Contamination 1; arterial injury 1; bone loss 1; post op Abx per protocol; NWB x 6-10 weeks  Dict:[id number]  Implants: Poetica 3 x 7.3mm cannulated screws

## 2019-10-04 NOTE — PATIENT PROFILE ADULT - IS PATIENT PREGNANT?
pt states her last period was 8/28- was in ED earlier for that reason, states tests were ran and she was not pregnant/unknown

## 2019-10-04 NOTE — BRIEF OPERATIVE NOTE - NSICDXBRIEFPREOP_GEN_ALL_CORE_FT
PRE-OP DIAGNOSIS:  Open displaced fracture of right calcaneus, initial encounter 04-Oct-2019 16:29:13 Type1 open Bassam Pink

## 2019-10-04 NOTE — PROGRESS NOTE ADULT - SUBJECTIVE AND OBJECTIVE BOX
Rapid response called 2/2 aphasia s/p 4mg Morphine IV. Patient was evaluated along with Rapid team. Narcan given to reverse Morphine. Patient had improvement of aphasia. AAO*3. Neurologically intact BL UE LE. Will send for CT Head. DC Morphine. Monitor closely overnight. Medicine is following too.

## 2019-10-04 NOTE — PROGRESS NOTE ADULT - SUBJECTIVE AND OBJECTIVE BOX
GENERAL SURGERY PROGRESS NOTE     ZEUS SHEA  24y  Female  Hospital day :1d    OVERNIGHT EVENTS: Patient is s/p ortho application of bulky siegel splint 2/2 open comminuted calcaneus fracture, f/u CT of foot, probable OR with ortho today   T(F): 96.4 (10-04-19 @ 05:00), Max: 98.5 (10-03-19 @ 12:24)  HR: 99 (10-04-19 @ 05:00) (99 - 118)  BP: 136/70 (10-04-19 @ 05:00) (127/64 - 162/103)  RR: 18 (10-04-19 @ 05:00) (18 - 30)  SpO2: 98% (10-04-19 @ 00:23) (98% - 100%)    DIET/FLUIDS: lactated ringers. 1000 milliLiter(s) IV Continuous <Continuous>      GI proph:  pantoprazole    Tablet 40 milliGRAM(s) Oral before breakfast    AC/ proph: heparin  Injectable 5000 Unit(s) SubCutaneous every 8 hours    ABx: ceFAZolin   IVPB 2000 milliGRAM(s) IV Intermittent every 8 hours    PHYSICAL EXAM:  GENERAL: NAD, well-appearing  CHEST/LUNG: Clear to auscultation bilaterally  HEART: Regular rate and rhythm  ABDOMEN: Soft, Nontender, Nondistended;   EXTREMITIES:  No clubbing, cyanosis, or edema      LABS    POCT Blood Glucose.: 160 mg/dL (03 Oct 2019 18:32)                          13.1   16.59 )-----------( 493      ( 03 Oct 2019 18:44 )             41.2       Auto Neutrophil %: 62.4 % (10-03-19 @ 18:44)  Auto Immature Granulocyte %: 0.7 % (10-03-19 @ 18:44)  Auto Neutrophil %: 70.9 % (10-03-19 @ 14:02)  Auto Immature Granulocyte %: 0.5 % (10-03-19 @ 14:02)    10-03    139  |  100  |  15  ----------------------------<  154<H>  4.5   |  23  |  0.8    Calcium, Total Serum: 10.5 mg/dL (10-03-19 @ 18:44)    LFTs:             8.3  | <0.2 | 28       ------------------[109     ( 03 Oct 2019 18:44 )  5.0  | x    | 28          Lipase:33       Lactate, Blood: 3.2 mmol/L (10-03-19 @ 18:44)    Coags:     11.00  ----< 0.96    ( 03 Oct 2019 18:44 )     36.0      Alcohol, Blood: <10 mg/dL (10-03-19 @ 18:44)    Urinalysis Basic - ( 03 Oct 2019 14:02 )    Color: Light Yellow / Appearance: Clear / S.012 / pH: x  Gluc: x / Ketone: Negative  / Bili: Negative / Urobili: <2 mg/dL   Blood: x / Protein: Negative / Nitrite: Negative   Leuk Esterase: Negative / RBC: x / WBC x   Sq Epi: x / Non Sq Epi: x / Bacteria: x    RADIOLOGY & ADDITIONAL TESTS:  < from: Xray Ankle Complete 3 Views, Right (10.03.19 @ 20:27) >  Impression:  There is complex comminuted fracture of the calcaneus with displacement   of some of the fragments. Extensive soft tissue swelling is noted with   small foci of soft tissue air. Recommend further evaluation with CT.    **F/U CT of foot
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
## 2019-10-05 LAB
ANISOCYTOSIS BLD QL: SLIGHT — SIGNIFICANT CHANGE UP
GIANT PLATELETS BLD QL SMEAR: PRESENT — SIGNIFICANT CHANGE UP
HCT VFR BLD CALC: 21 % — LOW (ref 37–47)
HCT VFR BLD CALC: 32.9 % — LOW (ref 37–47)
HGB BLD-MCNC: 10.1 G/DL — LOW (ref 12–16)
HGB BLD-MCNC: 5.1 G/DL — CRITICAL LOW (ref 12–16)
MANUAL SMEAR VERIFICATION: SIGNIFICANT CHANGE UP
MCHC RBC-ENTMCNC: 24.3 G/DL — LOW (ref 32–37)
MCHC RBC-ENTMCNC: 25.8 PG — LOW (ref 27–31)
MCHC RBC-ENTMCNC: 26.4 PG — LOW (ref 27–31)
MCHC RBC-ENTMCNC: 30.7 G/DL — LOW (ref 32–37)
MCV RBC AUTO: 108.8 FL — HIGH (ref 81–99)
MCV RBC AUTO: 84.1 FL — SIGNIFICANT CHANGE UP (ref 81–99)
MICROCYTES BLD QL: SLIGHT — SIGNIFICANT CHANGE UP
NEUTS BAND # BLD: 3.5 % — SIGNIFICANT CHANGE UP (ref 0–6)
NRBC # BLD: 0 /100 WBCS — SIGNIFICANT CHANGE UP (ref 0–0)
PLAT MORPH BLD: NORMAL — SIGNIFICANT CHANGE UP
PLATELET # BLD AUTO: 170 K/UL — SIGNIFICANT CHANGE UP (ref 130–400)
PLATELET # BLD AUTO: 364 K/UL — SIGNIFICANT CHANGE UP (ref 130–400)
RBC # BLD: 1.93 M/UL — LOW (ref 4.2–5.4)
RBC # BLD: 3.91 M/UL — LOW (ref 4.2–5.4)
RBC # FLD: 13.1 % — SIGNIFICANT CHANGE UP (ref 11.5–14.5)
RBC # FLD: 13.5 % — SIGNIFICANT CHANGE UP (ref 11.5–14.5)
RBC BLD AUTO: NORMAL — SIGNIFICANT CHANGE UP
VARIANT LYMPHS # BLD: 4.3 % — SIGNIFICANT CHANGE UP (ref 0–5)
WBC # BLD: 20.52 K/UL — HIGH (ref 4.8–10.8)
WBC # BLD: 9.55 K/UL — SIGNIFICANT CHANGE UP (ref 4.8–10.8)
WBC # FLD AUTO: 20.52 K/UL — HIGH (ref 4.8–10.8)
WBC # FLD AUTO: 9.55 K/UL — SIGNIFICANT CHANGE UP (ref 4.8–10.8)

## 2019-10-05 PROCEDURE — 70450 CT HEAD/BRAIN W/O DYE: CPT | Mod: 26

## 2019-10-05 RX ORDER — ONDANSETRON 8 MG/1
4 TABLET, FILM COATED ORAL EVERY 8 HOURS
Refills: 0 | Status: DISCONTINUED | OUTPATIENT
Start: 2019-10-05 | End: 2019-10-07

## 2019-10-05 RX ADMIN — Medication 650 MILLIGRAM(S): at 11:16

## 2019-10-05 RX ADMIN — Medication 1 TABLET(S): at 17:30

## 2019-10-05 RX ADMIN — OXYCODONE HYDROCHLORIDE 10 MILLIGRAM(S): 5 TABLET ORAL at 12:09

## 2019-10-05 RX ADMIN — Medication 650 MILLIGRAM(S): at 17:32

## 2019-10-05 RX ADMIN — Medication 100 MILLIGRAM(S): at 17:32

## 2019-10-05 RX ADMIN — ENOXAPARIN SODIUM 30 MILLIGRAM(S): 100 INJECTION SUBCUTANEOUS at 17:32

## 2019-10-05 RX ADMIN — Medication 650 MILLIGRAM(S): at 23:26

## 2019-10-05 RX ADMIN — OXYCODONE HYDROCHLORIDE 10 MILLIGRAM(S): 5 TABLET ORAL at 20:12

## 2019-10-05 RX ADMIN — OXYCODONE HYDROCHLORIDE 10 MILLIGRAM(S): 5 TABLET ORAL at 20:11

## 2019-10-05 RX ADMIN — ENOXAPARIN SODIUM 30 MILLIGRAM(S): 100 INJECTION SUBCUTANEOUS at 05:28

## 2019-10-05 RX ADMIN — ONDANSETRON 4 MILLIGRAM(S): 8 TABLET, FILM COATED ORAL at 18:53

## 2019-10-05 RX ADMIN — OXYCODONE HYDROCHLORIDE 10 MILLIGRAM(S): 5 TABLET ORAL at 11:15

## 2019-10-05 RX ADMIN — Medication 650 MILLIGRAM(S): at 12:07

## 2019-10-05 RX ADMIN — TRAMADOL HYDROCHLORIDE 50 MILLIGRAM(S): 50 TABLET ORAL at 22:55

## 2019-10-05 RX ADMIN — Medication 650 MILLIGRAM(S): at 05:28

## 2019-10-05 RX ADMIN — Medication 100 MILLIGRAM(S): at 05:28

## 2019-10-05 RX ADMIN — OXYCODONE HYDROCHLORIDE 10 MILLIGRAM(S): 5 TABLET ORAL at 05:29

## 2019-10-05 RX ADMIN — Medication 100 MILLIGRAM(S): at 04:50

## 2019-10-05 RX ADMIN — SENNA PLUS 2 TABLET(S): 8.6 TABLET ORAL at 22:54

## 2019-10-05 RX ADMIN — Medication 100 MILLIGRAM(S): at 16:02

## 2019-10-05 RX ADMIN — OXYCODONE HYDROCHLORIDE 10 MILLIGRAM(S): 5 TABLET ORAL at 16:02

## 2019-10-05 NOTE — PROGRESS NOTE ADULT - SUBJECTIVE AND OBJECTIVE BOX
ortho poc    s/e at bedside s/p R calcaneus ORIF, Rapid response was called for aphasia few hours after sx and 20 mins after Morphine 4mg IV given. Reversed by Narcan. Aphasia resolved. No acute events otherwise. CBC/BMP results are concerning for lab error, will repeat stat.      PE:  NAD, COMFORTABLE  RLE  Dressing and splint c/d/i  Compartments s/c  SILT toes  Can move toes  WWP      AP: 24F S/P R CALCANEOUS ORIF  - REPEAT CBC/BMP  - CT HEAD ORDERED BY RAPID TEAM PENDING  - Monitor vitals   - DCed morphine  - Pain control  - Abx postop  - PT, NWB RLE

## 2019-10-05 NOTE — PHYSICAL THERAPY INITIAL EVALUATION ADULT - GENERAL OBSERVATIONS, REHAB EVAL
PT was approached for initial PT evaluation, Pt unavailable at this time. Pt was transferred to OR. PT will follow up
9:30-10:00. chart reviewed. Pt received semi-alcantara at B/S, alert, oriented, able to follow multi-step instructions and agreeable to PT evaluation. + ace wrapping R LE below knee, + IV, initial vitals 124/75 .  Hgb 5.1 Likely an error, Pt is asymptomatic. NAD. Pt participate in ambulation training using RW/ crutches Pt was nauseous unable to participate  /94 . Nurse Marga made aware
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
## 2019-10-05 NOTE — PROGRESS NOTE ADULT - SUBJECTIVE AND OBJECTIVE BOX
SP ORIF Calcaneal fx    seen by PT - independent with crutches/walker NWB RLE    DC home once cleared by Ortho

## 2019-10-06 RX ORDER — DIPHENHYDRAMINE HCL 50 MG
25 CAPSULE ORAL EVERY 4 HOURS
Refills: 0 | Status: DISCONTINUED | OUTPATIENT
Start: 2019-10-06 | End: 2019-10-07

## 2019-10-06 RX ADMIN — Medication 100 MILLIGRAM(S): at 18:24

## 2019-10-06 RX ADMIN — TRAMADOL HYDROCHLORIDE 50 MILLIGRAM(S): 50 TABLET ORAL at 19:33

## 2019-10-06 RX ADMIN — Medication 650 MILLIGRAM(S): at 11:02

## 2019-10-06 RX ADMIN — Medication 650 MILLIGRAM(S): at 23:30

## 2019-10-06 RX ADMIN — Medication 1 TABLET(S): at 11:00

## 2019-10-06 RX ADMIN — Medication 650 MILLIGRAM(S): at 10:59

## 2019-10-06 RX ADMIN — Medication 650 MILLIGRAM(S): at 05:14

## 2019-10-06 RX ADMIN — SENNA PLUS 2 TABLET(S): 8.6 TABLET ORAL at 21:42

## 2019-10-06 RX ADMIN — Medication 650 MILLIGRAM(S): at 05:11

## 2019-10-06 RX ADMIN — OXYCODONE HYDROCHLORIDE 10 MILLIGRAM(S): 5 TABLET ORAL at 14:40

## 2019-10-06 RX ADMIN — OXYCODONE HYDROCHLORIDE 10 MILLIGRAM(S): 5 TABLET ORAL at 01:25

## 2019-10-06 RX ADMIN — Medication 100 MILLIGRAM(S): at 05:11

## 2019-10-06 RX ADMIN — OXYCODONE HYDROCHLORIDE 10 MILLIGRAM(S): 5 TABLET ORAL at 06:55

## 2019-10-06 RX ADMIN — OXYCODONE HYDROCHLORIDE 10 MILLIGRAM(S): 5 TABLET ORAL at 18:37

## 2019-10-06 RX ADMIN — Medication 650 MILLIGRAM(S): at 18:24

## 2019-10-06 RX ADMIN — Medication 25 MILLIGRAM(S): at 08:52

## 2019-10-06 RX ADMIN — ENOXAPARIN SODIUM 30 MILLIGRAM(S): 100 INJECTION SUBCUTANEOUS at 18:24

## 2019-10-06 RX ADMIN — ENOXAPARIN SODIUM 30 MILLIGRAM(S): 100 INJECTION SUBCUTANEOUS at 05:11

## 2019-10-06 RX ADMIN — OXYCODONE HYDROCHLORIDE 10 MILLIGRAM(S): 5 TABLET ORAL at 23:11

## 2019-10-06 RX ADMIN — TRAMADOL HYDROCHLORIDE 50 MILLIGRAM(S): 50 TABLET ORAL at 05:11

## 2019-10-06 NOTE — SBIRT NOTE ADULT - NSSBIRTDRGBRIEFINTDET_GEN_A_CORE
SW reviewed results of assessment with Pt, provided support and psychoeducation regarding risks of use of recreational drugs on health and functioning. Pt conveyed insight into current usage. Pt declined referral.

## 2019-10-06 NOTE — SBIRT NOTE ADULT - NSSBIRTALCPOSREINDET_GEN_A_CORE
SW reviewed results of assessment with Pt. Pt provided positive reinforcement of Pt's decision to monitor their drinking, and provided psychoeducation about the risks and impact of alcohol on overall health and functioning. Pt declined referral to treatment providers.

## 2019-10-06 NOTE — PROGRESS NOTE ADULT - SUBJECTIVE AND OBJECTIVE BOX
ortho progress    s/e at bedside s/p R calcaneus ORIF, no acute events overnight, CT head negative.                          10.1   20.52 )-----------( 364      ( 05 Oct 2019 12:09 )             32.9     ICU Vital Signs Last 24 Hrs  T(C): 35.9 (06 Oct 2019 08:00), Max: 36.4 (06 Oct 2019 00:00)  T(F): 96.6 (06 Oct 2019 08:00), Max: 97.5 (06 Oct 2019 00:00)  HR: 85 (06 Oct 2019 08:00) (85 - 119)  BP: 102/54 (06 Oct 2019 08:00) (102/54 - 152/91)  BP(mean): --  ABP: --  ABP(mean): --  RR: 18 (06 Oct 2019 08:00) (18 - 18)  SpO2: --      PE:  NAD, COMFORTABLE  RLE  Dressing and splint c/d/i  Compartments s/c  SILT toes  Can move toes  WWP      AP: 24F S/P R CALCANEOUS ORIF, POD#2  - Monitor vitals   - DCed morphine  - Pain control  - Abx postop COMPLETE  - PT, NWB RLE  - Rehab cleared the patients  - Dispo Monday.

## 2019-10-07 ENCOUNTER — TRANSCRIPTION ENCOUNTER (OUTPATIENT)
Age: 24
End: 2019-10-07

## 2019-10-07 VITALS
DIASTOLIC BLOOD PRESSURE: 67 MMHG | TEMPERATURE: 97 F | SYSTOLIC BLOOD PRESSURE: 115 MMHG | RESPIRATION RATE: 18 BRPM | HEART RATE: 84 BPM

## 2019-10-07 LAB
GLUCOSE BLDC GLUCOMTR-MCNC: 175 MG/DL — HIGH (ref 70–99)
HCT VFR BLD CALC: 31.8 % — LOW (ref 37–47)
HGB BLD-MCNC: 10 G/DL — LOW (ref 12–16)
MCHC RBC-ENTMCNC: 26.6 PG — LOW (ref 27–31)
MCHC RBC-ENTMCNC: 31.4 G/DL — LOW (ref 32–37)
MCV RBC AUTO: 84.6 FL — SIGNIFICANT CHANGE UP (ref 81–99)
NRBC # BLD: 0 /100 WBCS — SIGNIFICANT CHANGE UP (ref 0–0)
PLATELET # BLD AUTO: 345 K/UL — SIGNIFICANT CHANGE UP (ref 130–400)
RBC # BLD: 3.76 M/UL — LOW (ref 4.2–5.4)
RBC # FLD: 12.9 % — SIGNIFICANT CHANGE UP (ref 11.5–14.5)
WBC # BLD: 10.7 K/UL — SIGNIFICANT CHANGE UP (ref 4.8–10.8)
WBC # FLD AUTO: 10.7 K/UL — SIGNIFICANT CHANGE UP (ref 4.8–10.8)

## 2019-10-07 RX ORDER — OXYCODONE HYDROCHLORIDE 5 MG/1
1 TABLET ORAL
Qty: 42 | Refills: 0
Start: 2019-10-07 | End: 2019-10-13

## 2019-10-07 RX ORDER — ACETAMINOPHEN 500 MG
2 TABLET ORAL
Qty: 0 | Refills: 0 | DISCHARGE
Start: 2019-10-07

## 2019-10-07 RX ORDER — ASPIRIN/CALCIUM CARB/MAGNESIUM 324 MG
1 TABLET ORAL
Qty: 60 | Refills: 0
Start: 2019-10-07 | End: 2019-11-05

## 2019-10-07 RX ADMIN — Medication 1 TABLET(S): at 11:11

## 2019-10-07 RX ADMIN — Medication 100 MILLIGRAM(S): at 05:23

## 2019-10-07 RX ADMIN — OXYCODONE HYDROCHLORIDE 10 MILLIGRAM(S): 5 TABLET ORAL at 11:27

## 2019-10-07 RX ADMIN — Medication 650 MILLIGRAM(S): at 05:27

## 2019-10-07 RX ADMIN — Medication 650 MILLIGRAM(S): at 05:23

## 2019-10-07 RX ADMIN — ENOXAPARIN SODIUM 30 MILLIGRAM(S): 100 INJECTION SUBCUTANEOUS at 05:23

## 2019-10-07 RX ADMIN — Medication 650 MILLIGRAM(S): at 11:12

## 2019-10-07 RX ADMIN — OXYCODONE HYDROCHLORIDE 10 MILLIGRAM(S): 5 TABLET ORAL at 11:09

## 2019-10-07 RX ADMIN — OXYCODONE HYDROCHLORIDE 10 MILLIGRAM(S): 5 TABLET ORAL at 05:24

## 2019-10-07 RX ADMIN — Medication 650 MILLIGRAM(S): at 11:11

## 2019-10-07 RX ADMIN — OXYCODONE HYDROCHLORIDE 10 MILLIGRAM(S): 5 TABLET ORAL at 06:30
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
## 2019-10-07 NOTE — DISCHARGE NOTE PROVIDER - CARE PROVIDER_API CALL
Bassam Pink)  Orthopaedic Surgery  [address] [place] [address]  Phone: [phone]  Fax: [phone]  Follow Up Time:

## 2019-10-07 NOTE — DISCHARGE NOTE PROVIDER - NSDCFUADDINST_GEN_ALL_CORE_FT
Do not get splint wet. Elevate extremity as much as possible . Call Dr. Pink if any increasing pain , numbness, fevers or if you have any questions or concerns. Follow up with Dr. Pink in 10- 14 days. Do not get splint wet. Elevate extremity as much as possible . Call Dr. Pink if any increasing pain , numbness, fevers or if you have any questions or concerns. Follow up with Dr. Pink in 10- 14 days.    Aspirin twice daily to prevent blood clots.   Non weight bearing right lower extremity

## 2019-10-07 NOTE — DISCHARGE NOTE NURSING/CASE MANAGEMENT/SOCIAL WORK - PATIENT PORTAL LINK FT
You can access the FollowMyHealth Patient Portal offered by [facility] by registering at the following website: [email or web address]. By joining [facility]’s FollowMyHealth portal, you will also be able to view your health information using other applications (apps) compatible with our system.

## 2019-10-07 NOTE — PROGRESS NOTE ADULT - SUBJECTIVE AND OBJECTIVE BOX
ORTHO PROGRESS NOTE       24yFemale POD #  3    S/P orif right calcaneus     Patient seen and examined at bedside . The patient is awake and alert in NAD. No complaints of chest pain, SOB, N/V.    Vital Signs Last 24 Hrs  T(C): 36.3 (07 Oct 2019 00:00), Max: 36.5 (06 Oct 2019 15:57)  T(F): 97.4 (07 Oct 2019 00:00), Max: 97.7 (06 Oct 2019 15:57)  HR: 91 (07 Oct 2019 00:00) (91 - 94)  BP: 110/63 (07 Oct 2019 00:00) (110/63 - 123/77)  BP(mean): --  RR: 18 (07 Oct 2019 00:00) (18 - 18)  SpO2: --                          10.0   10.70 )-----------( 345      ( 07 Oct 2019 06:07 )             31.8                 DVT ppx : lovenox     MEDICATIONS  (STANDING):  acetaminophen   Tablet .. 650 milliGRAM(s) Oral every 6 hours  docusate sodium 100 milliGRAM(s) Oral two times a day  enoxaparin Injectable 30 milliGRAM(s) SubCutaneous two times a day  multivitamin 1 Tablet(s) Oral daily  senna 2 Tablet(s) Oral at bedtime    MEDICATIONS  (PRN):  ALBUTerol/ipratropium for Nebulization 3 milliLiter(s) Nebulizer every 6 hours PRN Bronchospasm  diphenhydrAMINE 25 milliGRAM(s) Oral every 4 hours PRN Rash and/or Itching  ondansetron Injectable 4 milliGRAM(s) IV Push every 8 hours PRN Nausea and/or Vomiting  oxyCODONE    IR 10 milliGRAM(s) Oral every 4 hours PRN Moderate Pain (4 - 6)  traMADol 50 milliGRAM(s) Oral every 4 hours PRN Mild Pain (1 - 3)      PE:   right foot wounds clean and dry, ecchymosis and edema, splint reapplied           Compartments soft         NVI, SILT           A/P: 24yFemale POD #  3  S/P  orif right calcaneus.            OOB to Chair            Physical Therapy           Pain control            Incentive Spirometry            DVT Prophylaxis            Discharge planning - home today
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
## 2019-10-11 DIAGNOSIS — S92.041B: ICD-10-CM

## 2019-10-11 DIAGNOSIS — M79.671 PAIN IN RIGHT FOOT: ICD-10-CM

## 2019-10-11 DIAGNOSIS — R47.01 APHASIA: ICD-10-CM

## 2019-10-11 DIAGNOSIS — T40.2X5A ADVERSE EFFECT OF OTHER OPIOIDS, INITIAL ENCOUNTER: ICD-10-CM
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
## 2019-10-13 ENCOUNTER — EMERGENCY (EMERGENCY)
Facility: HOSPITAL | Age: 24
LOS: 0 days | Discharge: HOME | End: 2019-10-13
Admitting: EMERGENCY MEDICINE
Payer: MEDICAID

## 2019-10-13 VITALS
DIASTOLIC BLOOD PRESSURE: 78 MMHG | RESPIRATION RATE: 18 BRPM | HEART RATE: 105 BPM | OXYGEN SATURATION: 98 % | TEMPERATURE: 98 F | SYSTOLIC BLOOD PRESSURE: 152 MMHG

## 2019-10-13 DIAGNOSIS — S90.31XD CONTUSION OF RIGHT FOOT, SUBSEQUENT ENCOUNTER: ICD-10-CM
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
## 2019-10-13 DIAGNOSIS — Z76.0 ENCOUNTER FOR ISSUE OF REPEAT PRESCRIPTION: ICD-10-CM

## 2019-10-13 DIAGNOSIS — Z88.0 ALLERGY STATUS TO PENICILLIN: ICD-10-CM
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
## 2019-10-13 DIAGNOSIS — S92.001D UNSPECIFIED FRACTURE OF RIGHT CALCANEUS, SUBSEQUENT ENCOUNTER FOR FRACTURE WITH ROUTINE HEALING: ICD-10-CM

## 2019-10-13 PROCEDURE — 99283 EMERGENCY DEPT VISIT LOW MDM: CPT

## 2019-10-13 RX ORDER — OXYCODONE AND ACETAMINOPHEN 5; 325 MG/1; MG/1
1 TABLET ORAL ONCE
Refills: 0 | Status: DISCONTINUED | OUTPATIENT
Start: 2019-10-13 | End: 2019-10-13

## 2019-10-13 RX ADMIN — OXYCODONE AND ACETAMINOPHEN 1 TABLET(S): 5; 325 TABLET ORAL at 17:44

## 2019-10-13 NOTE — ED PROVIDER NOTE - PATIENT PORTAL LINK FT
You can access the FollowMyHealth Patient Portal offered by [facility] by registering at the following website: [email or web address]. By joining [facility]’s FollowMyHealth portal, you will also be able to view your health information using other applications (apps) compatible with our system.

## 2019-10-13 NOTE — ED PROVIDER NOTE - CARE PLAN
Principal Discharge DX:	Calcaneus fracture, right  Secondary Diagnosis:	S/P ORIF (open reduction internal fixation) fracture

## 2019-10-13 NOTE — ED PROVIDER NOTE - CARE PROVIDER_API CALL
Bassam Pink)  Orthopaedic Surgery  [address] [place] [address]  Phone: [phone]  Fax: [phone]  Follow Up Time:

## 2019-10-13 NOTE — ED ADULT TRIAGE NOTE - CHIEF COMPLAINT QUOTE
Pt had R heel sx approximately 2 weeks ago, pt ran out of medication. Pt requesting Oxycodone refill.
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
## 2020-09-02 ENCOUNTER — OUTPATIENT (OUTPATIENT)
Dept: OUTPATIENT SERVICES | Facility: HOSPITAL | Age: 25
LOS: 1 days | Discharge: HOME | End: 2020-09-02

## 2020-09-02 VITALS
OXYGEN SATURATION: 97 % | RESPIRATION RATE: 16 BRPM | DIASTOLIC BLOOD PRESSURE: 78 MMHG | WEIGHT: 235.89 LBS | SYSTOLIC BLOOD PRESSURE: 130 MMHG | TEMPERATURE: 98 F | HEIGHT: 65 IN | HEART RATE: 98 BPM

## 2020-09-02 DIAGNOSIS — T84.84XA PAIN DUE TO INTERNAL ORTHOPEDIC PROSTHETIC DEVICES, IMPLANTS AND GRAFTS, INITIAL ENCOUNTER: ICD-10-CM

## 2020-09-02 DIAGNOSIS — Z90.89 ACQUIRED ABSENCE OF OTHER ORGANS: Chronic | ICD-10-CM

## 2020-09-02 DIAGNOSIS — Z01.818 ENCOUNTER FOR OTHER PREPROCEDURAL EXAMINATION: ICD-10-CM

## 2020-09-02 DIAGNOSIS — T84.84XD PAIN DUE TO INTERNAL ORTHOPEDIC PROSTHETIC DEVICES, IMPLANTS AND GRAFTS, SUBSEQUENT ENCOUNTER: ICD-10-CM

## 2020-09-02 DIAGNOSIS — Z98.890 OTHER SPECIFIED POSTPROCEDURAL STATES: Chronic | ICD-10-CM

## 2020-09-02 LAB
ALBUMIN SERPL ELPH-MCNC: 4.1 G/DL — SIGNIFICANT CHANGE UP (ref 3.5–5.2)
ALP SERPL-CCNC: 107 U/L — SIGNIFICANT CHANGE UP (ref 30–115)
ALT FLD-CCNC: 22 U/L — SIGNIFICANT CHANGE UP (ref 0–41)
ANION GAP SERPL CALC-SCNC: 10 MMOL/L — SIGNIFICANT CHANGE UP (ref 7–14)
APPEARANCE UR: CLEAR — SIGNIFICANT CHANGE UP
APTT BLD: 36.7 SEC — SIGNIFICANT CHANGE UP (ref 27–39.2)
AST SERPL-CCNC: 19 U/L — SIGNIFICANT CHANGE UP (ref 0–41)
BASOPHILS # BLD AUTO: 0.04 K/UL — SIGNIFICANT CHANGE UP (ref 0–0.2)
BASOPHILS NFR BLD AUTO: 0.4 % — SIGNIFICANT CHANGE UP (ref 0–1)
BILIRUB SERPL-MCNC: <0.2 MG/DL — SIGNIFICANT CHANGE UP (ref 0.2–1.2)
BILIRUB UR-MCNC: NEGATIVE — SIGNIFICANT CHANGE UP
BUN SERPL-MCNC: 6 MG/DL — LOW (ref 10–20)
CALCIUM SERPL-MCNC: 9.6 MG/DL — SIGNIFICANT CHANGE UP (ref 8.5–10.1)
CHLORIDE SERPL-SCNC: 103 MMOL/L — SIGNIFICANT CHANGE UP (ref 98–110)
CO2 SERPL-SCNC: 24 MMOL/L — SIGNIFICANT CHANGE UP (ref 17–32)
COLOR SPEC: SIGNIFICANT CHANGE UP
CREAT SERPL-MCNC: 0.5 MG/DL — LOW (ref 0.7–1.5)
DIFF PNL FLD: NEGATIVE — SIGNIFICANT CHANGE UP
EOSINOPHIL # BLD AUTO: 0.11 K/UL — SIGNIFICANT CHANGE UP (ref 0–0.7)
EOSINOPHIL NFR BLD AUTO: 1 % — SIGNIFICANT CHANGE UP (ref 0–8)
GLUCOSE SERPL-MCNC: 152 MG/DL — HIGH (ref 70–99)
GLUCOSE UR QL: NEGATIVE — SIGNIFICANT CHANGE UP
HCT VFR BLD CALC: 38.7 % — SIGNIFICANT CHANGE UP (ref 37–47)
HGB BLD-MCNC: 11.9 G/DL — LOW (ref 12–16)
IMM GRANULOCYTES NFR BLD AUTO: 0.4 % — HIGH (ref 0.1–0.3)
INR BLD: 1.03 RATIO — SIGNIFICANT CHANGE UP (ref 0.65–1.3)
KETONES UR-MCNC: NEGATIVE — SIGNIFICANT CHANGE UP
LEUKOCYTE ESTERASE UR-ACNC: NEGATIVE — SIGNIFICANT CHANGE UP
LYMPHOCYTES # BLD AUTO: 2.39 K/UL — SIGNIFICANT CHANGE UP (ref 1.2–3.4)
LYMPHOCYTES # BLD AUTO: 22.3 % — SIGNIFICANT CHANGE UP (ref 20.5–51.1)
MCHC RBC-ENTMCNC: 25.2 PG — LOW (ref 27–31)
MCHC RBC-ENTMCNC: 30.7 G/DL — LOW (ref 32–37)
MCV RBC AUTO: 82 FL — SIGNIFICANT CHANGE UP (ref 81–99)
MONOCYTES # BLD AUTO: 0.56 K/UL — SIGNIFICANT CHANGE UP (ref 0.1–0.6)
MONOCYTES NFR BLD AUTO: 5.2 % — SIGNIFICANT CHANGE UP (ref 1.7–9.3)
NEUTROPHILS # BLD AUTO: 7.56 K/UL — HIGH (ref 1.4–6.5)
NEUTROPHILS NFR BLD AUTO: 70.7 % — SIGNIFICANT CHANGE UP (ref 42.2–75.2)
NITRITE UR-MCNC: NEGATIVE — SIGNIFICANT CHANGE UP
NRBC # BLD: 0 /100 WBCS — SIGNIFICANT CHANGE UP (ref 0–0)
PH UR: 6 — SIGNIFICANT CHANGE UP (ref 5–8)
PLATELET # BLD AUTO: 411 K/UL — HIGH (ref 130–400)
POTASSIUM SERPL-MCNC: 4.8 MMOL/L — SIGNIFICANT CHANGE UP (ref 3.5–5)
POTASSIUM SERPL-SCNC: 4.8 MMOL/L — SIGNIFICANT CHANGE UP (ref 3.5–5)
PROT SERPL-MCNC: 6.9 G/DL — SIGNIFICANT CHANGE UP (ref 6–8)
PROT UR-MCNC: NEGATIVE — SIGNIFICANT CHANGE UP
PROTHROM AB SERPL-ACNC: 11.8 SEC — SIGNIFICANT CHANGE UP (ref 9.95–12.87)
RBC # BLD: 4.72 M/UL — SIGNIFICANT CHANGE UP (ref 4.2–5.4)
RBC # FLD: 14.3 % — SIGNIFICANT CHANGE UP (ref 11.5–14.5)
SODIUM SERPL-SCNC: 137 MMOL/L — SIGNIFICANT CHANGE UP (ref 135–146)
SP GR SPEC: 1.01 — SIGNIFICANT CHANGE UP (ref 1.01–1.02)
UROBILINOGEN FLD QL: SIGNIFICANT CHANGE UP
WBC # BLD: 10.7 K/UL — SIGNIFICANT CHANGE UP (ref 4.8–10.8)
WBC # FLD AUTO: 10.7 K/UL — SIGNIFICANT CHANGE UP (ref 4.8–10.8)
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
## 2020-09-02 NOTE — H&P PST ADULT - HISTORY OF PRESENT ILLNESS
Pt complains of pain in right foot that interferes with ADLs and ability to walk. Denies any chest pain, difficulty breathing, SOB, palpitations, dysuria, URI, or any other infections in the last 2 weeks. Denies any recent travel, contact, or exposure to any persons with known or suspected COVID-19. Pt also denies COVID testing within the last 2 weeks. Pt advised to self quarantine until day of procedure. Exercise tolerance of 2 flights of stairs without dyspnea. JONATHAN reviewed with patient.    Anesthesia Alert  NO--Difficult Airway  NO--History of neck surgery or radiation  NO--Limited ROM of neck  NO--History of Malignant hyperthermia  NO--No personal or family history of Pseudocholinesterase deficiency.  NO--Prior Anesthesia Complication  NO--Latex Allergy  NO--Loose teeth  NO--History of Rheumatoid Arthritis  NO--JONATHAN  NO--Other_____

## 2020-09-06 ENCOUNTER — LABORATORY RESULT (OUTPATIENT)
Age: 25
End: 2020-09-06

## 2020-09-06 ENCOUNTER — OUTPATIENT (OUTPATIENT)
Dept: OUTPATIENT SERVICES | Facility: HOSPITAL | Age: 25
LOS: 1 days | Discharge: HOME | End: 2020-09-06

## 2020-09-06 DIAGNOSIS — Z90.89 ACQUIRED ABSENCE OF OTHER ORGANS: Chronic | ICD-10-CM

## 2020-09-06 DIAGNOSIS — Z98.890 OTHER SPECIFIED POSTPROCEDURAL STATES: Chronic | ICD-10-CM

## 2020-09-06 DIAGNOSIS — Z11.59 ENCOUNTER FOR SCREENING FOR OTHER VIRAL DISEASES: ICD-10-CM

## 2020-09-06 PROBLEM — J45.909 UNSPECIFIED ASTHMA, UNCOMPLICATED: Chronic | Status: ACTIVE | Noted: 2018-05-02

## 2020-09-09 ENCOUNTER — OUTPATIENT (OUTPATIENT)
Dept: OUTPATIENT SERVICES | Facility: HOSPITAL | Age: 25
LOS: 1 days | Discharge: HOME | End: 2020-09-09

## 2020-09-09 VITALS
SYSTOLIC BLOOD PRESSURE: 107 MMHG | HEIGHT: 65 IN | HEART RATE: 90 BPM | DIASTOLIC BLOOD PRESSURE: 70 MMHG | TEMPERATURE: 98 F | RESPIRATION RATE: 20 BRPM | WEIGHT: 235.89 LBS

## 2020-09-09 VITALS
OXYGEN SATURATION: 100 % | RESPIRATION RATE: 15 BRPM | SYSTOLIC BLOOD PRESSURE: 112 MMHG | DIASTOLIC BLOOD PRESSURE: 63 MMHG | HEART RATE: 81 BPM | TEMPERATURE: 98 F

## 2020-09-09 DIAGNOSIS — Z90.89 ACQUIRED ABSENCE OF OTHER ORGANS: Chronic | ICD-10-CM

## 2020-09-09 DIAGNOSIS — Z98.890 OTHER SPECIFIED POSTPROCEDURAL STATES: Chronic | ICD-10-CM

## 2020-09-09 RX ORDER — SODIUM CHLORIDE 9 MG/ML
1000 INJECTION, SOLUTION INTRAVENOUS
Refills: 0 | Status: DISCONTINUED | OUTPATIENT
Start: 2020-09-09 | End: 2020-09-09

## 2020-09-09 RX ORDER — CEPHALEXIN 500 MG
1 CAPSULE ORAL
Qty: 6 | Refills: 0
Start: 2020-09-09 | End: 2020-09-10

## 2020-09-09 RX ORDER — HYDROMORPHONE HYDROCHLORIDE 2 MG/ML
0.5 INJECTION INTRAMUSCULAR; INTRAVENOUS; SUBCUTANEOUS
Refills: 0 | Status: DISCONTINUED | OUTPATIENT
Start: 2020-09-09 | End: 2020-09-09

## 2020-09-09 RX ORDER — TRAMADOL HYDROCHLORIDE 50 MG/1
1 TABLET ORAL
Qty: 20 | Refills: 0
Start: 2020-09-09

## 2020-09-09 RX ADMIN — HYDROMORPHONE HYDROCHLORIDE 0.5 MILLIGRAM(S): 2 INJECTION INTRAMUSCULAR; INTRAVENOUS; SUBCUTANEOUS at 15:07

## 2020-09-09 RX ADMIN — SODIUM CHLORIDE 75 MILLILITER(S): 9 INJECTION, SOLUTION INTRAVENOUS at 14:37

## 2020-09-09 RX ADMIN — HYDROMORPHONE HYDROCHLORIDE 0.5 MILLIGRAM(S): 2 INJECTION INTRAMUSCULAR; INTRAVENOUS; SUBCUTANEOUS at 14:39

## 2020-09-09 NOTE — BRIEF OPERATIVE NOTE - NSICDXBRIEFPOSTOP_GEN_ALL_CORE_FT
POST-OP DIAGNOSIS:  Retained orthopedic hardware 09-Sep-2020 14:07:33 prominent screws, right calcaneus Bassam Pink

## 2020-09-09 NOTE — BRIEF OPERATIVE NOTE - OPERATION/FINDINGS
above symptomatic hardware; post op WBAT; Abx per protocol  Dict:  Explanted: 3 x 6.5mm canulated screws above symptomatic hardware; post op WBAT; Abx per protocol  Dict:[id number]  Explanted: 3 x 6.5mm canulated screws

## 2020-09-09 NOTE — BRIEF OPERATIVE NOTE - NSICDXBRIEFPROCEDURE_GEN_ALL_CORE_FT
PROCEDURES:  Removal, screw, deep 09-Sep-2020 14:06:35 3 screws from calcaneus ; CPT code 81552 Bassam Pink

## 2020-09-09 NOTE — PHYSICAL THERAPY INITIAL EVALUATION ADULT - LEVEL OF INDEPENDENCE: STAIR NEGOTIATION, REHAB EVAL
pt declined and states she knows how to do stairs from prior surgery and will go up and down using one rail. Proper technique (up with the good foot, down with the bad foot) reviewed with radha.

## 2020-09-09 NOTE — PHYSICAL THERAPY INITIAL EVALUATION ADULT - MODALITIES TREATMENT COMMENTS
pt is trained and safe in use of crutches WBAT RLE on level surfaces. pt declined stairs and states she knows how use crutches already and how to do stairs from prior surgery and will go up and down using one rail. Proper technique (up with the good foot, down with the bad foot) reviewed with radha. pt was issued crutches and was dc'd home from PACU.

## 2020-09-09 NOTE — ASU PATIENT PROFILE, ADULT - FALL HARM RISK TYPE OF ASSESSMENT
Admission Responsibility to family and others/Future oriented/Supportive social network or family/Engaged in work or school/Identifies reasons for living/Positive therapeutic relationships

## 2020-09-09 NOTE — ASU DISCHARGE PLAN (ADULT/PEDIATRIC) - CARE PROVIDER_API CALL
Bassam Pink)  Orthopaedic Surgery  [address] [place] [address]  Phone: [phone]  Fax: [phone]  Follow Up Time: 2 weeks

## 2020-09-09 NOTE — BRIEF OPERATIVE NOTE - NSICDXBRIEFPREOP_GEN_ALL_CORE_FT
PRE-OP DIAGNOSIS:  Retained orthopedic hardware 09-Sep-2020 14:07:05 prominent screws, right calcaneus Bassam Pink

## 2020-09-09 NOTE — CHART NOTE - NSCHARTNOTEFT_GEN_A_CORE
PACU ANESTHESIA ADMISSION NOTE      Procedure: Removal, screw, deep: 3 screws from right calcaneus ; CPT code 76975    Post op diagnosis:  Retained orthopedic hardware right calcaneus      ____  Intubated  TV:______       Rate: ______      FiO2: ______    __x__  Patent Airway    __x__  Full return of protective reflexes    __x__  Full recovery from anesthesia / back to baseline status    Vitals:  T(C): 98.1  HR: 90   BP: 134/90  RR: 10  SpO2: 100%    Mental Status:  __x__ Awake   _____ Alert   _____ Drowsy   _____ Sedated    Nausea/Vomiting:  __x__ NO  ______Yes,   See Post - Op Orders          Pain Scale (0-10):  __0__    Treatment: __x__ None    ____ See Post - Op/PCA Orders    Post - Operative Fluids:   __x__ Oral   ____ See Post - Op Orders    Plan: Discharge:   __x__Home       _____Floor     _____Critical Care    _____  Other:_________________    Comments: uneventful anesthesia course no complications. VItals stable. Pt transferred to PACU

## 2020-09-09 NOTE — ASU DISCHARGE PLAN (ADULT/PEDIATRIC) - CALL YOUR DOCTOR IF YOU HAVE ANY OF THE FOLLOWING:
Inability to tolerate liquids or foods/Nausea and vomiting that does not stop/Pain not relieved by Medications/Bleeding that does not stop/Wound/Surgical Site with redness, or foul smelling discharge or pus/Numbness, tingling, color or temperature change to extremity

## 2020-09-11 DIAGNOSIS — J45.909 UNSPECIFIED ASTHMA, UNCOMPLICATED: ICD-10-CM

## 2020-09-11 DIAGNOSIS — Z47.2 ENCOUNTER FOR REMOVAL OF INTERNAL FIXATION DEVICE: ICD-10-CM

## 2020-11-21 NOTE — CHART NOTE - NSCHARTNOTEFT_GEN_A_CORE
Rapid Response   Patient is a 24y old  Female  admitted for   Rapid response team called due to Altered Mental Status    Patient was seen and examined at the bedside by the rapid response team.    Allergies    lettuce (Other)  No Known Drug Allergies    Intolerances        PAST MEDICAL & SURGICAL HISTORY:  Asthma  No significant past surgical history      Vital Signs Last 24 Hrs  T(C): 36.9 (04 Oct 2019 19:30), Max: 37 (04 Oct 2019 13:50)  T(F): 98.4 (04 Oct 2019 19:30), Max: 98.6 (04 Oct 2019 13:50)  HR: 105 (04 Oct 2019 19:30) (91 - 110)  BP: 140/97 (04 Oct 2019 19:30) (119/75 - 140/97)  BP(mean): --  RR: 18 (04 Oct 2019 19:30) (18 - 20)  SpO2: 96% (04 Oct 2019 15:16) (96% - 100%)          GENERAL: The patient is awake and alert in no apparent distress.   HEENT: Head is normocephalic and atraumatic. Extraocular muscles are intact. Mucous membranes are moist. No throat erythema/exudates no lymphadenopathy, no JVD,   NECK: Supple.  LUNGS: Clear to auscultation BL without wheezing, rales or rhonchi; respirations unlabored  HEART: Regular rate and rhythm ,+S1/+S2, no murmurs, rubs, gallops  ABDOMEN: Soft, nontender, and nondistended, no rebound, guarding rigidity, bowel sounds in all 4 quadrants  EXTREMITIES: Without any cyanosis, clubbing, rash, lesions or edema.  SKIN: No new rashes or lesions.  MSK: strength equal BL  VASCULAR: Radial and Dorsal pedal pulses palpable BL  NEUROLOGIC: Grossly intact.  PSYCH: No new changes.    10-04 @ 07:01  -  10- @ 21:17  --------------------------------------------------------  IN: 1070 mL / OUT: 500 mL / NET: 570 mL                              11.4   16.08 )-----------( 378      ( 04 Oct 2019 08:52 )             35.7     10-04    141  |  104  |  10  ----------------------------<  121<H>  4.5   |  25  |  0.5<L>    Ca    9.6      04 Oct 2019 08:52  Phos  4.2     10-  Mg     2.0     10-    TPro  8.3<H>  /  Alb  5.0  /  TBili  <0.2  /  DBili  x   /  AST  28  /  ALT  28  /  AlkPhos  109  10-03         LIVER FUNCTIONS - ( 03 Oct 2019 18:44 )  Alb: 5.0 g/dL / Pro: 8.3 g/dL / ALK PHOS: 109 U/L / ALT: 28 U/L / AST: 28 U/L / GGT: x         Urinalysis Basic - ( 03 Oct 2019 14:02 )    Color: Light Yellow / Appearance: Clear / S.012 / pH: x  Gluc: x / Ketone: Negative  / Bili: Negative / Urobili: <2 mg/dL   Blood: x / Protein: Negative / Nitrite: Negative   Leuk Esterase: Negative / RBC: x / WBC x   Sq Epi: x / Non Sq Epi: x / Bacteria: x         PT/INR - ( 04 Oct 2019 08:52 )   PT: 11.90 sec;   INR: 1.04 ratio         PTT - ( 04 Oct 2019 08:52 )  PTT:40.1 sec    MEDICATIONS  (STANDING):  acetaminophen   Tablet .. 650 milliGRAM(s) Oral every 6 hours  ceFAZolin   IVPB 2000 milliGRAM(s) IV Intermittent every 8 hours  docusate sodium 100 milliGRAM(s) Oral two times a day  enoxaparin Injectable 30 milliGRAM(s) SubCutaneous two times a day  multivitamin 1 Tablet(s) Oral daily  senna 2 Tablet(s) Oral at bedtime  sodium chloride 0.9%. 1000 milliLiter(s) (100 mL/Hr) IV Continuous <Continuous>    MEDICATIONS  (PRN):  ALBUTerol/ipratropium for Nebulization 3 milliLiter(s) Nebulizer every 6 hours PRN Bronchospasm  oxyCODONE    IR 10 milliGRAM(s) Oral every 4 hours PRN Moderate Pain (4 - 6)  traMADol 50 milliGRAM(s) Oral every 4 hours PRN Mild Pain (1 - 3)      Assessment- Rapid Response called for 24y year old Female with a past medical history of     Plan- suture/staple removal Rapid Response   Patient is a 24y old  Female  admitted for     Rapid response team called due to Altered Mental Status. The patient is s/p R calcaneous ORIF , rapid response called for altered mental status and was unable to speak shortly after the patient received 4 mg of IV morphine. 0.4 mg of Narcan were administered and the patient's mental status improved.   Allergies    lettuce (Other)  No Known Drug Allergies    Intolerances  PAST MEDICAL & SURGICAL HISTORY:  Asthma  No significant past surgical history      Vital Signs Last 24 Hrs  T(C): 36.9 (04 Oct 2019 19:30), Max: 37 (04 Oct 2019 13:50)  T(F): 98.4 (04 Oct 2019 19:30), Max: 98.6 (04 Oct 2019 13:50)  HR: 105 (04 Oct 2019 19:30) (91 - 110)  BP: 140/97 (04 Oct 2019 19:30) (119/75 - 140/97)  BP(mean): --  RR: 18 (04 Oct 2019 19:30) (18 - 20)  SpO2: 96% (04 Oct 2019 15:16) (96% - 100%)      GENERAL: The patient is awake and alert in no apparent distress.   HEENT: Head is normocephalic and atraumatic. Extraocular muscles are intact. Mucous membranes are moist. No throat erythema/exudates no lymphadenopathy, no JVD,   NECK: Supple.  LUNGS: Clear to auscultation BL without wheezing, rales or rhonchi; respirations unlabored  HEART: Regular rate and rhythm ,+S1/+S2, no murmurs, rubs, gallops  ABDOMEN: Soft, nontender, and nondistended, no rebound, guarding rigidity, bowel sounds in all 4 quadrants  EXTREMITIES: Without any cyanosis, clubbing, rash, lesions or edema.  SKIN: No new rashes or lesions.  MSK: strength equal BL  VASCULAR: Radial and Dorsal pedal pulses palpable BL  NEUROLOGIC: Grossly intact.  PSYCH: No new changes.    10-04 @ 07:  -  10-04 @ 21:17  --------------------------------------------------------  IN: 1070 mL / OUT: 500 mL / NET: 570 mL                              11.4   16.08 )-----------( 378      ( 04 Oct 2019 08:52 )             35.7     10-    141  |  104  |  10  ----------------------------<  121<H>  4.5   |  25  |  0.5<L>    Ca    9.6      04 Oct 2019 08:52  Phos  4.2     10-  Mg     2.0     10-04    TPro  8.3<H>  /  Alb  5.0  /  TBili  <0.2  /  DBili  x   /  AST  28  /  ALT  28  /  AlkPhos  109  10-        LIVER FUNCTIONS - ( 03 Oct 2019 18:44 )  Alb: 5.0 g/dL / Pro: 8.3 g/dL / ALK PHOS: 109 U/L / ALT: 28 U/L / AST: 28 U/L / GGT: x         Urinalysis Basic - ( 03 Oct 2019 14:02 )    Color: Light Yellow / Appearance: Clear / S.012 / pH: x  Gluc: x / Ketone: Negative  / Bili: Negative / Urobili: <2 mg/dL   Blood: x / Protein: Negative / Nitrite: Negative   Leuk Esterase: Negative / RBC: x / WBC x   Sq Epi: x / Non Sq Epi: x / Bacteria: x     PT/INR - ( 04 Oct 2019 08:52 )   PT: 11.90 sec;   INR: 1.04 ratio       PTT - ( 04 Oct 2019 08:52 )  PTT:40.1 sec    MEDICATIONS  (STANDING):  acetaminophen   Tablet .. 650 milliGRAM(s) Oral every 6 hours  ceFAZolin   IVPB 2000 milliGRAM(s) IV Intermittent every 8 hours  docusate sodium 100 milliGRAM(s) Oral two times a day  enoxaparin Injectable 30 milliGRAM(s) SubCutaneous two times a day  multivitamin 1 Tablet(s) Oral daily  senna 2 Tablet(s) Oral at bedtime  sodium chloride 0.9%. 1000 milliLiter(s) (100 mL/Hr) IV Continuous <Continuous>    MEDICATIONS  (PRN):  ALBUTerol/ipratropium for Nebulization 3 milliLiter(s) Nebulizer every 6 hours PRN Bronchospasm  oxyCODONE    IR 10 milliGRAM(s) Oral every 4 hours PRN Moderate Pain (4 - 6)  traMADol 50 milliGRAM(s) Oral every 4 hours PRN Mild Pain (1 - 3)      Assessment- Rapid Response called for 24y year old Female with a past medical history of asthma here with calcaneous fracture s/p r calcaneous ORIF , rapid response called for altered mental status shortly after the patient received 4 mg of IV morphine.     Plan-  1) CT head w/o contrast  2) F/U CBC, BMP, Mg, Phosphorus  3) D/C Morphine  4) c/w Oxycodone and Tramadol per ortho  4) Ortho will continue to follow the patient Rapid Response called for AMS      24y year old Female with a past medical history of asthma here with calcaneous fracture s/p r calcaneous ORIF. Rapid response called for altered mental status shortly after the patient received 4 mg of IV morphine at 8:10pm. She was unable to speak shortly after receiving 4 mg of IV morphine. She was given 0.4 mg of Narcan and the patient's mental status improved with resolution of aphasia.    Allergies  lettuce (Other)  No Known Drug Allergies    Intolerances  PAST MEDICAL & SURGICAL HISTORY:  Asthma  No significant past surgical history      Vital Signs Last 24 Hrs  T(C): 36.9 (04 Oct 2019 19:30), Max: 37 (04 Oct 2019 13:50)  T(F): 98.4 (04 Oct 2019 19:30), Max: 98.6 (04 Oct 2019 13:50)  HR: 105 (04 Oct 2019 19:30) (91 - 110)  BP: 140/97 (04 Oct 2019 19:30) (119/75 - 140/97)  BP(mean): --  RR: 18 (04 Oct 2019 19:30) (18 - 20)  SpO2: 96% (04 Oct 2019 15:16) (96% - 100%)      GENERAL: The patient is awake and alert in no apparent distress.   HEENT: Head is normocephalic and atraumatic. Extraocular muscles are intact. Mucous membranes are moist. No throat erythema/exudates no lymphadenopathy, no JVD,   NECK: Supple.  LUNGS: Clear to auscultation BL without wheezing, rales or rhonchi; respirations unlabored  HEART: Regular rate and rhythm ,+S1/+S2, no murmurs, rubs, gallops  ABDOMEN: Soft, nontender, and nondistended, no rebound, guarding rigidity, bowel sounds in all 4 quadrants  EXTREMITIES: Without any cyanosis, clubbing, rash, lesions or edema.  SKIN: No new rashes or lesions.  MSK: strength equal BL  VASCULAR: Radial and Dorsal pedal pulses palpable BL  NEUROLOGIC: Grossly intact.  PSYCH: No new changes.    10-04 @ 07:01  -  10-04 @ 21:17  --------------------------------------------------------  IN: 1070 mL / OUT: 500 mL / NET: 570 mL                              11.4   16.08 )-----------( 378      ( 04 Oct 2019 08:52 )             35.7     10    141  |  104  |  10  ----------------------------<  121<H>  4.5   |  25  |  0.5<L>    Ca    9.6      04 Oct 2019 08:52  Phos  4.2     10  Mg     2.0     10-04    TPro  8.3<H>  /  Alb  5.0  /  TBili  <0.2  /  DBili  x   /  AST  28  /  ALT  28  /  AlkPhos  109  10-03        LIVER FUNCTIONS - ( 03 Oct 2019 18:44 )  Alb: 5.0 g/dL / Pro: 8.3 g/dL / ALK PHOS: 109 U/L / ALT: 28 U/L / AST: 28 U/L / GGT: x         Urinalysis Basic - ( 03 Oct 2019 14:02 )    Color: Light Yellow / Appearance: Clear / S.012 / pH: x  Gluc: x / Ketone: Negative  / Bili: Negative / Urobili: <2 mg/dL   Blood: x / Protein: Negative / Nitrite: Negative   Leuk Esterase: Negative / RBC: x / WBC x   Sq Epi: x / Non Sq Epi: x / Bacteria: x     PT/INR - ( 04 Oct 2019 08:52 )   PT: 11.90 sec;   INR: 1.04 ratio       PTT - ( 04 Oct 2019 08:52 )  PTT:40.1 sec    MEDICATIONS  (STANDING):  acetaminophen   Tablet .. 650 milliGRAM(s) Oral every 6 hours  ceFAZolin   IVPB 2000 milliGRAM(s) IV Intermittent every 8 hours  docusate sodium 100 milliGRAM(s) Oral two times a day  enoxaparin Injectable 30 milliGRAM(s) SubCutaneous two times a day  multivitamin 1 Tablet(s) Oral daily  senna 2 Tablet(s) Oral at bedtime  sodium chloride 0.9%. 1000 milliLiter(s) (100 mL/Hr) IV Continuous <Continuous>    MEDICATIONS  (PRN):  ALBUTerol/ipratropium for Nebulization 3 milliLiter(s) Nebulizer every 6 hours PRN Bronchospasm  oxyCODONE    IR 10 milliGRAM(s) Oral every 4 hours PRN Moderate Pain (4 - 6)  traMADol 50 milliGRAM(s) Oral every 4 hours PRN Mild Pain (1 - 3)      Assessment- Rapid Response called for 24y year old Female with a past medical history of asthma here with calcaneous fracture s/p r calcaneous ORIF , rapid response called for altered mental status shortly after the patient received 4 mg of IV morphine.     Plan-  1) CT head w/o contrast  2) F/U CBC, BMP, Mg, Phosphorus  3) D/C Morphine  4) c/w Oxycodone and Tramadol per ortho  4) Ortho will continue to follow the patient

## 2020-12-09 NOTE — PHYSICAL THERAPY INITIAL EVALUATION ADULT - FOLLOWS COMMANDS/ANSWERS QUESTIONS, REHAB EVAL
You were seen today in the Pediatric Cardiology Clinic      Cardiology Providers you saw during your visit:  Dr. Elijah Vergara and Dr. Akira Wei MD     Chief Complaint: Bicuspid aortic valve and WPW (ablated)     Results:    Echocardiogram looks great (some tiny, tiny leakage).      Recommendations:   - No heavy weightlifting (< 1/2 body weight, must be able to talk and breathe through lifting)  - No other exercise restrictions.  - No antibiotic prophylaxis required  - Call for new symptoms or recurrent SVT. To ER if doesn't break with vagal maneuvers in 15 min     SBE prophylaxis:  Yes____  No_X_     Exercise restrictions:  Yes_X__  No____   If yes list restrictions:   - No heavy weightlifting (< 1/2 body weight, must be able to talk and breathe through lifting)       Follow-up:    In one year with echocardiogram        Thank you for your visit today. If you have questions about today's visit, please call [facility] at [phone] or [facility] Nurse Line [phone]     For after hours urgent needs call [phone] and ask to speak to the Pediatric Cardiology Physician on call.  For emergencies call [phone].    
100% of the time

## 2022-01-08 ENCOUNTER — EMERGENCY (EMERGENCY)
Facility: HOSPITAL | Age: 27
LOS: 0 days | Discharge: LEFT BEFORE DISCHARGE | End: 2022-01-09
Attending: EMERGENCY MEDICINE | Admitting: EMERGENCY MEDICINE
Payer: MEDICAID

## 2022-01-08 VITALS
TEMPERATURE: 96 F | DIASTOLIC BLOOD PRESSURE: 82 MMHG | HEART RATE: 107 BPM | SYSTOLIC BLOOD PRESSURE: 133 MMHG | RESPIRATION RATE: 18 BRPM | WEIGHT: 217.82 LBS | OXYGEN SATURATION: 100 % | HEIGHT: 65 IN

## 2022-01-08 DIAGNOSIS — R10.9 UNSPECIFIED ABDOMINAL PAIN: ICD-10-CM

## 2022-01-08 DIAGNOSIS — Z98.890 OTHER SPECIFIED POSTPROCEDURAL STATES: Chronic | ICD-10-CM

## 2022-01-08 DIAGNOSIS — Z90.89 ACQUIRED ABSENCE OF OTHER ORGANS: Chronic | ICD-10-CM

## 2022-01-08 DIAGNOSIS — R11.10 VOMITING, UNSPECIFIED: ICD-10-CM

## 2022-01-08 DIAGNOSIS — Z91.018 ALLERGY TO OTHER FOODS: ICD-10-CM

## 2022-01-08 DIAGNOSIS — Z53.21 PROCEDURE AND TREATMENT NOT CARRIED OUT DUE TO PATIENT LEAVING PRIOR TO BEING SEEN BY HEALTH CARE PROVIDER: ICD-10-CM

## 2022-01-08 DIAGNOSIS — M54.50 LOW BACK PAIN, UNSPECIFIED: ICD-10-CM

## 2022-01-08 LAB
ALBUMIN SERPL ELPH-MCNC: 4.5 G/DL — SIGNIFICANT CHANGE UP (ref 3.5–5.2)
ALP SERPL-CCNC: 102 U/L — SIGNIFICANT CHANGE UP (ref 30–115)
ALT FLD-CCNC: 11 U/L — SIGNIFICANT CHANGE UP (ref 0–41)
ANION GAP SERPL CALC-SCNC: 14 MMOL/L — SIGNIFICANT CHANGE UP (ref 7–14)
APPEARANCE UR: CLEAR — SIGNIFICANT CHANGE UP
AST SERPL-CCNC: 14 U/L — SIGNIFICANT CHANGE UP (ref 0–41)
BACTERIA # UR AUTO: NEGATIVE — SIGNIFICANT CHANGE UP
BASOPHILS # BLD AUTO: 0.02 K/UL — SIGNIFICANT CHANGE UP (ref 0–0.2)
BASOPHILS NFR BLD AUTO: 0.2 % — SIGNIFICANT CHANGE UP (ref 0–1)
BILIRUB SERPL-MCNC: 0.2 MG/DL — SIGNIFICANT CHANGE UP (ref 0.2–1.2)
BILIRUB UR-MCNC: NEGATIVE — SIGNIFICANT CHANGE UP
BUN SERPL-MCNC: 7 MG/DL — LOW (ref 10–20)
CALCIUM SERPL-MCNC: 9.8 MG/DL — SIGNIFICANT CHANGE UP (ref 8.5–10.1)
CHLORIDE SERPL-SCNC: 100 MMOL/L — SIGNIFICANT CHANGE UP (ref 98–110)
CO2 SERPL-SCNC: 21 MMOL/L — SIGNIFICANT CHANGE UP (ref 17–32)
COLOR SPEC: COLORLESS — SIGNIFICANT CHANGE UP
CREAT SERPL-MCNC: 0.6 MG/DL — LOW (ref 0.7–1.5)
DIFF PNL FLD: ABNORMAL
EOSINOPHIL # BLD AUTO: 0.1 K/UL — SIGNIFICANT CHANGE UP (ref 0–0.7)
EOSINOPHIL NFR BLD AUTO: 0.8 % — SIGNIFICANT CHANGE UP (ref 0–8)
EPI CELLS # UR: 1 /HPF — SIGNIFICANT CHANGE UP (ref 0–5)
GLUCOSE SERPL-MCNC: 115 MG/DL — HIGH (ref 70–99)
GLUCOSE UR QL: NEGATIVE — SIGNIFICANT CHANGE UP
HCG SERPL QL: NEGATIVE — SIGNIFICANT CHANGE UP
HCT VFR BLD CALC: 39.8 % — SIGNIFICANT CHANGE UP (ref 37–47)
HGB BLD-MCNC: 12.6 G/DL — SIGNIFICANT CHANGE UP (ref 12–16)
HYALINE CASTS # UR AUTO: 0 /LPF — SIGNIFICANT CHANGE UP (ref 0–7)
IMM GRANULOCYTES NFR BLD AUTO: 0.2 % — SIGNIFICANT CHANGE UP (ref 0.1–0.3)
KETONES UR-MCNC: NEGATIVE — SIGNIFICANT CHANGE UP
LACTATE SERPL-SCNC: 0.9 MMOL/L — SIGNIFICANT CHANGE UP (ref 0.7–2)
LEUKOCYTE ESTERASE UR-ACNC: ABNORMAL
LIDOCAIN IGE QN: 27 U/L — SIGNIFICANT CHANGE UP (ref 7–60)
LYMPHOCYTES # BLD AUTO: 2.64 K/UL — SIGNIFICANT CHANGE UP (ref 1.2–3.4)
LYMPHOCYTES # BLD AUTO: 20.8 % — SIGNIFICANT CHANGE UP (ref 20.5–51.1)
MCHC RBC-ENTMCNC: 26.4 PG — LOW (ref 27–31)
MCHC RBC-ENTMCNC: 31.7 G/DL — LOW (ref 32–37)
MCV RBC AUTO: 83.3 FL — SIGNIFICANT CHANGE UP (ref 81–99)
MONOCYTES # BLD AUTO: 0.86 K/UL — HIGH (ref 0.1–0.6)
MONOCYTES NFR BLD AUTO: 6.8 % — SIGNIFICANT CHANGE UP (ref 1.7–9.3)
NEUTROPHILS # BLD AUTO: 9.02 K/UL — HIGH (ref 1.4–6.5)
NEUTROPHILS NFR BLD AUTO: 71.2 % — SIGNIFICANT CHANGE UP (ref 42.2–75.2)
NITRITE UR-MCNC: NEGATIVE — SIGNIFICANT CHANGE UP
NRBC # BLD: 0 /100 WBCS — SIGNIFICANT CHANGE UP (ref 0–0)
PH UR: 7 — SIGNIFICANT CHANGE UP (ref 5–8)
PLATELET # BLD AUTO: 429 K/UL — HIGH (ref 130–400)
POTASSIUM SERPL-MCNC: 4 MMOL/L — SIGNIFICANT CHANGE UP (ref 3.5–5)
POTASSIUM SERPL-SCNC: 4 MMOL/L — SIGNIFICANT CHANGE UP (ref 3.5–5)
PROT SERPL-MCNC: 7.4 G/DL — SIGNIFICANT CHANGE UP (ref 6–8)
PROT UR-MCNC: NEGATIVE — SIGNIFICANT CHANGE UP
RBC # BLD: 4.78 M/UL — SIGNIFICANT CHANGE UP (ref 4.2–5.4)
RBC # FLD: 14.2 % — SIGNIFICANT CHANGE UP (ref 11.5–14.5)
RBC CASTS # UR COMP ASSIST: 0 /HPF — SIGNIFICANT CHANGE UP (ref 0–4)
SODIUM SERPL-SCNC: 135 MMOL/L — SIGNIFICANT CHANGE UP (ref 135–146)
SP GR SPEC: 1.01 — LOW (ref 1.01–1.03)
UROBILINOGEN FLD QL: SIGNIFICANT CHANGE UP
WBC # BLD: 12.67 K/UL — HIGH (ref 4.8–10.8)
WBC # FLD AUTO: 12.67 K/UL — HIGH (ref 4.8–10.8)
WBC UR QL: 5 /HPF — SIGNIFICANT CHANGE UP (ref 0–5)

## 2022-01-08 PROCEDURE — 99284 EMERGENCY DEPT VISIT MOD MDM: CPT

## 2022-01-08 NOTE — ED ADULT TRIAGE NOTE - CHIEF COMPLAINT QUOTE
Patient presents to ED c/o LLQ, low back pain x 2 weeks associated with nausea and several episodes vomiting and diarrhea

## 2022-01-08 NOTE — ED ADULT NURSE NOTE - NSIMPLEMENTINTERV_GEN_ALL_ED
Implemented All Universal Safety Interventions:  [place] to call system. Call bell, personal items and telephone within reach. Instruct patient to call for assistance. Room bathroom lighting operational. Non-slip footwear when patient is off stretcher. Physically safe environment: no spills, clutter or unnecessary equipment. Stretcher in lowest position, wheels locked, appropriate side rails in place.

## 2022-01-09 VITALS
TEMPERATURE: 98 F | RESPIRATION RATE: 18 BRPM | OXYGEN SATURATION: 100 % | DIASTOLIC BLOOD PRESSURE: 72 MMHG | HEART RATE: 95 BPM | SYSTOLIC BLOOD PRESSURE: 119 MMHG

## 2022-01-09 NOTE — ED PROVIDER NOTE - PHYSICAL EXAMINATION
CONST: Well appearing in NAD  EYES: PERRL, EOMI, Sclera and conjunctiva clear.   ENT: No nasal discharge. Oropharynx normal appearing  NECK: Non-tender, no meningeal signs. normal ROM. supple   CARD: Normal S1 S2; Normal rate and rhythm  RESP: Equal BS B/L, No wheezes, rhonchi or rales. No distress  GI: Soft, Left lower abdominal tenderness, non-distended. no cva tenderness. normal BS  MS: Normal ROM in all extremities. No midline spinal tenderness. pulses 2 +. no calf tenderness or swelling  SKIN: Warm, dry, no acute rashes. Good turgor  NEURO: A&Ox3, No focal deficits.

## 2022-01-09 NOTE — ED PROVIDER NOTE - IV ALTEPLASE ADMIN OUTSIDE HIDDEN
There are no preventive care reminders to display for this patient.    Patient is up to date, no discussion needed.             show

## 2022-01-09 NOTE — ED PROVIDER NOTE - OBJECTIVE STATEMENT
26 year old female with no pmhx presents with left lower back and abdominal pain x 2 weeks. pain radiates from back to front, sharp and intermittent. Also admits to vomiting and diarrhea. No fever, chills, chest pain, sob, urinary symptoms, vaginal bleeding or discharge.

## 2022-01-09 NOTE — ED PROVIDER NOTE - ATTENDING CONTRIBUTION TO CARE
Patient is c/o nausea/vomiting/diarrhea/abd cramping. Denies blood in the stool, denies travel, denies recent abx use.   Vitals reviewed.   Patient is awake, alert, answering questions appropriately, appears comfortable and not in any distress.  Lungs: CTA, no wheezing, no crackles.  Abd: +BS, NT, ND, soft, no rebound, no guarding. No CVA tenderness, no rash.  CNS: awake, alert, o x 3, no focal neurologic deficits.  A/P: Acute gastroenteritis,   labs, reevaluation.

## 2022-01-10 LAB
CULTURE RESULTS: SIGNIFICANT CHANGE UP
SPECIMEN SOURCE: SIGNIFICANT CHANGE UP

## 2022-01-13 ENCOUNTER — EMERGENCY (EMERGENCY)
Facility: HOSPITAL | Age: 27
LOS: 0 days | Discharge: HOME | End: 2022-01-13
Attending: STUDENT IN AN ORGANIZED HEALTH CARE EDUCATION/TRAINING PROGRAM | Admitting: STUDENT IN AN ORGANIZED HEALTH CARE EDUCATION/TRAINING PROGRAM
Payer: COMMERCIAL

## 2022-01-13 VITALS
SYSTOLIC BLOOD PRESSURE: 127 MMHG | HEART RATE: 104 BPM | TEMPERATURE: 99 F | HEIGHT: 65 IN | RESPIRATION RATE: 99 BRPM | DIASTOLIC BLOOD PRESSURE: 86 MMHG | OXYGEN SATURATION: 99 %

## 2022-01-13 DIAGNOSIS — M54.2 CERVICALGIA: ICD-10-CM

## 2022-01-13 DIAGNOSIS — M54.50 LOW BACK PAIN, UNSPECIFIED: ICD-10-CM

## 2022-01-13 DIAGNOSIS — M25.512 PAIN IN LEFT SHOULDER: ICD-10-CM

## 2022-01-13 DIAGNOSIS — Z90.89 ACQUIRED ABSENCE OF OTHER ORGANS: Chronic | ICD-10-CM

## 2022-01-13 DIAGNOSIS — Z91.018 ALLERGY TO OTHER FOODS: ICD-10-CM
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
## 2022-01-13 DIAGNOSIS — Z98.890 OTHER SPECIFIED POSTPROCEDURAL STATES: Chronic | ICD-10-CM

## 2022-01-13 DIAGNOSIS — J45.909 UNSPECIFIED ASTHMA, UNCOMPLICATED: ICD-10-CM
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
## 2022-01-13 PROCEDURE — 99284 EMERGENCY DEPT VISIT MOD MDM: CPT

## 2022-01-13 PROCEDURE — 93010 ELECTROCARDIOGRAM REPORT: CPT

## 2022-01-13 RX ORDER — ACETAMINOPHEN 500 MG
650 TABLET ORAL ONCE
Refills: 0 | Status: COMPLETED | OUTPATIENT
Start: 2022-01-13 | End: 2022-01-13

## 2022-01-13 RX ORDER — METHOCARBAMOL 500 MG/1
1000 TABLET, FILM COATED ORAL ONCE
Refills: 0 | Status: COMPLETED | OUTPATIENT
Start: 2022-01-13 | End: 2022-01-13

## 2022-01-13 RX ADMIN — METHOCARBAMOL 1000 MILLIGRAM(S): 500 TABLET, FILM COATED ORAL at 22:17

## 2022-01-13 RX ADMIN — Medication 650 MILLIGRAM(S): at 21:03
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
## 2022-01-13 NOTE — ED PROVIDER NOTE - NS_EDPROVIDERDISPOUSERTYPE_ED_A_ED
May use oral benadryl or apply over the counter hydrocortisone cream to the area if itching.    If the rash worsens, becomes red, or has drainage and looks infected, please return to the ED.   Attending Attestation (For Attendings USE Only)...

## 2022-01-13 NOTE — ED PROVIDER NOTE - CARE PROVIDER_API CALL
JESSICA JARRETT  Family Medicine  [address] [place] [address]  Phone: [phone]  Fax: [phone]  Follow Up Time: Routine

## 2022-01-13 NOTE — ED PROVIDER NOTE - NS ED ROS FT
Review of Systems:  	•	CONSTITUTIONAL: no fever   	•	SKIN: no rash   	•	ENT: no change in hearing, no tinnitus   	•	RESPIRATORY: no shortness of breath, no cough  	•	CARDIAC: no chest pain, no palpitations  	•	GI: no abd pain, no nausea, no vomiting, no diarrhea   	•	MUSCULOSKELETAL: +L sided neck pain, +L shoulder pain  	•	NEUROLOGIC: no weakness, no headache   	•	PSYCH: no anxiety, non suicidal, non homicidal, no hallucination, no depression
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
## 2022-01-13 NOTE — ED PROVIDER NOTE - NSFOLLOWUPCLINICS_GEN_ALL_ED_FT
[facility] ([facility])  Rehabilitation  [facility] [address], [address] [place] [address]  Phone: [phone]  Fax:   Follow Up Time: 1-3 Days

## 2022-01-13 NOTE — ED PROVIDER NOTE - NSFOLLOWUPINSTRUCTIONS_ED_ALL_ED_FT
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
## 2022-01-13 NOTE — ED PROVIDER NOTE - PHYSICAL EXAMINATION
CONSTITUTIONAL: Well-developed; well-nourished; in no acute distress, nontoxic appearing  SKIN: skin exam is warm and dry  HEAD: Normocephalic; atraumatic  EYES: conjunctiva and sclera clear.  NECK: No c-spine tenderness. +L sided paracervical TTP, no skin changes. ROM intact.  CARD: S1, S2 normal, no murmur  RESP:  Good air movement bilaterally   EXT: Normal ROM. No midline tenderness  NEURO: awake, alert, following commands, oriented, grossly unremarkable. No Focal deficits. GCS 15.   PSYCH: Cooperative, appropriate.

## 2022-01-13 NOTE — ED PROVIDER NOTE - PATIENT PORTAL LINK FT
You can access the FollowMyHealth Patient Portal offered by [facility] by registering at the following website: [email or web address]. By joining [employer]’s FollowMyHealth portal, you will also be able to view your health information using other applications (apps) compatible with our system.
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
## 2022-06-10 NOTE — ASU PREOP CHECKLIST - HEART RATE (BEATS/MIN)
[age] y/o male presents to presurgical testing with diagnosis of other specified diseases of intestine scheduled for robotic assisted laparoscopic appendectomy possible colon resection. Pt with history of iron deficiency anemia, was treated with iron infusions in 4/2022, was hospitalized for GIB in 5/2022 at [facility], required blood transfusion. 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 Capsule endoscopy found intestinal polyps, one of them large and may be precancerous.

## 2022-10-10 ENCOUNTER — EMERGENCY (EMERGENCY)
Facility: HOSPITAL | Age: 27
LOS: 0 days | Discharge: HOME | End: 2022-10-10
Attending: EMERGENCY MEDICINE | Admitting: EMERGENCY MEDICINE

## 2022-10-10 VITALS
SYSTOLIC BLOOD PRESSURE: 144 MMHG | OXYGEN SATURATION: 99 % | TEMPERATURE: 99 F | RESPIRATION RATE: 18 BRPM | WEIGHT: 229.94 LBS | DIASTOLIC BLOOD PRESSURE: 91 MMHG | HEART RATE: 109 BPM | HEIGHT: 65 IN

## 2022-10-10 VITALS — HEART RATE: 92 BPM

## 2022-10-10 DIAGNOSIS — R05.1 ACUTE COUGH: ICD-10-CM

## 2022-10-10 DIAGNOSIS — Z91.018 ALLERGY TO OTHER FOODS: ICD-10-CM

## 2022-10-10 DIAGNOSIS — Z90.89 ACQUIRED ABSENCE OF OTHER ORGANS: ICD-10-CM

## 2022-10-10 DIAGNOSIS — J45.909 UNSPECIFIED ASTHMA, UNCOMPLICATED: ICD-10-CM

## 2022-10-10 DIAGNOSIS — Z90.89 ACQUIRED ABSENCE OF OTHER ORGANS: Chronic | ICD-10-CM

## 2022-10-10 DIAGNOSIS — M79.10 MYALGIA, UNSPECIFIED SITE: ICD-10-CM

## 2022-10-10 DIAGNOSIS — R50.9 FEVER, UNSPECIFIED: ICD-10-CM

## 2022-10-10 DIAGNOSIS — Z98.890 OTHER SPECIFIED POSTPROCEDURAL STATES: Chronic | ICD-10-CM

## 2022-10-10 DIAGNOSIS — R09.89 OTHER SPECIFIED SYMPTOMS AND SIGNS INVOLVING THE CIRCULATORY AND RESPIRATORY SYSTEMS: ICD-10-CM

## 2022-10-10 DIAGNOSIS — R00.0 TACHYCARDIA, UNSPECIFIED: ICD-10-CM

## 2022-10-10 LAB
FLUAV AG NPH QL: SIGNIFICANT CHANGE UP
FLUBV AG NPH QL: SIGNIFICANT CHANGE UP
RSV RNA NPH QL NAA+NON-PROBE: SIGNIFICANT CHANGE UP
SARS-COV-2 RNA SPEC QL NAA+PROBE: SIGNIFICANT CHANGE UP

## 2022-10-10 PROCEDURE — 93010 ELECTROCARDIOGRAM REPORT: CPT

## 2022-10-10 PROCEDURE — 99284 EMERGENCY DEPT VISIT MOD MDM: CPT

## 2022-10-10 PROCEDURE — 71045 X-RAY EXAM CHEST 1 VIEW: CPT | Mod: 26

## 2022-10-10 RX ORDER — KETOROLAC TROMETHAMINE 30 MG/ML
30 SYRINGE (ML) INJECTION ONCE
Refills: 0 | Status: DISCONTINUED | OUTPATIENT
Start: 2022-10-10 | End: 2022-10-10

## 2022-10-10 RX ORDER — PSEUDOEPHEDRINE HCL 30 MG
1 TABLET ORAL
Qty: 16 | Refills: 0
Start: 2022-10-10 | End: 2022-10-13

## 2022-10-10 RX ADMIN — Medication 30 MILLIGRAM(S): at 20:54

## 2022-10-10 NOTE — ED PROVIDER NOTE - PROGRESS NOTE DETAILS
RK: patient comfortable appearing ambulating, mom at bedside who will help take care of pt, pt to f/u with her pmd Dr. Cornelius.

## 2022-10-10 NOTE — ED PROVIDER NOTE - PHYSICAL EXAMINATION
CONSTITUTIONAL: well-developed, well-nourished, in no acute distress non-toxic appearing  SKIN: warm, dry  HEAD: Normocephalic  EYES: no conjunctival erythema  ENT: no nasal discharge, airway clear  NECK: full ROM  CARD: regular rate and rhythm  RESP: normal respiratory effort, no wheezes, rales or rhonchi  ABD: soft, non-distended, non-tender  EXT: moving all extremities spontaneously  NEURO: alert and oriented, grossly unremarkable  PSYCH: cooperative, appropriate

## 2022-10-10 NOTE — ED PROVIDER NOTE - NSFOLLOWUPINSTRUCTIONS_ED_ALL_ED_FT
Upper Respiratory Infection, Adult  An upper respiratory infection (URI) is a common viral infection of the nose, throat, and upper air passages that lead to the lungs. The most common type of URI is the common cold. URIs usually get better on their own, without medical treatment.    What are the causes?  A URI is caused by a virus. You may catch a virus by:    Breathing in droplets from an infected person's cough or sneeze.  Touching something that has been exposed to the virus (contaminated) and then touching your mouth, nose, or eyes.    What increases the risk?  You are more likely to get a URI if:    You are very young or very old.  It is breanna or winter.  You have close contact with others, such as at a , school, or health care facility.  You smoke.  You have long-term (chronic) heart or lung disease.  You have a weakened disease-fighting (immune) system.  You have nasal allergies or asthma.  You are experiencing a lot of stress.  You work in an area that has poor air circulation.  You have poor nutrition.    What are the signs or symptoms?  A URI usually involves some of the following symptoms:    Runny or stuffy (congested) nose.  Sneezing.  Cough.  Sore throat.  Headache.  Fatigue.  Fever.  Loss of appetite.  Pain in your forehead, behind your eyes, and over your cheekbones (sinus pain).  Muscle aches.  Redness or irritation of the eyes.  Pressure in the ears or face.    How is this diagnosed?  This condition may be diagnosed based on your medical history and symptoms, and a physical exam. Your health care provider may use a cotton swab to take a mucus sample from your nose (nasal swab). This sample can be tested to determine what virus is causing the illness.    How is this treated?  URIs usually get better on their own within 7–10 days. You can take steps at home to relieve your symptoms. Medicines cannot cure URIs, but your health care provider may recommend certain medicines to help relieve symptoms, such as:    Over-the-counter cold medicines.  Cough suppressants. Coughing is a type of defense against infection that helps to clear the respiratory system, so take these medicines only as recommended by your health care provider.  Fever-reducing medicines.    Follow these instructions at home:  Activity     Rest as needed.  If you have a fever, stay home from work or school until your fever is gone or until your health care provider says you are no longer contagious. Your health care provider may have you wear a face mask to prevent your infection from spreading.  Relieving symptoms     Gargle with a salt-water mixture 3–4 times a day or as needed. To make a salt-water mixture, completely dissolve ½–1 tsp of salt in 1 cup of warm water.  Use a cool-mist humidifier to add moisture to the air. This can help you breathe more easily.  Eating and drinking     Drink enough fluid to keep your urine pale yellow.  ImageEat soups and other clear broths.  General instructions     Take over-the-counter and prescription medicines only as told by your health care provider. These include cold medicines, fever reducers, and cough suppressants.  Do not use any products that contain nicotine or tobacco, such as cigarettes and e-cigarettes. If you need help quitting, ask your health care provider.   Stay away from secondhand smoke.  Stay up to date on all immunizations, including the yearly (annual) flu vaccine.  ImageKeep all follow-up visits as told by your health care provider. This is important.  How to prevent the spread of infection to others     ImageURIs can be passed from person to person (are contagious). To prevent the infection from spreading:    Wash your hands often with soap and water. If soap and water are not available, use hand .  Avoid touching your mouth, face, eyes, or nose.  Cough or sneeze into a tissue or your sleeve or elbow instead of into your hand or into the air.    Contact a health care provider if:  You are getting worse instead of better.  You have a fever or chills.  Your mucus is brown or red.  You have yellow or brown discharge coming from your nose.  You have pain in your face, especially when you bend forward.  You have swollen neck glands.  You have pain while swallowing.  You have white areas in the back of your throat.  Get help right away if:  You have shortness of breath that gets worse.  You have severe or persistent:    Headache.  Ear pain.  Sinus pain.  Chest pain.    You have chronic lung disease along with any of the following:    Wheezing.  Prolonged cough.  Coughing up blood.  A change in your usual mucus.    You have a stiff neck.  You have changes in your:    Vision.  Hearing.  Thinking.  Mood.    Summary  An upper respiratory infection (URI) is a common infection of the nose, throat, and upper air passages that lead to the lungs.  A URI is caused by a virus.  URIs usually get better on their own within 7–10 days.  Medicines cannot cure URIs, but your health care provider may recommend certain medicines to help relieve symptoms.

## 2022-10-10 NOTE — ED PROVIDER NOTE - NSFOLLOWUPCLINICS_GEN_ALL_ED_FT
[facility]  Medicine  [address], [place]   Phone: [phone]  Fax:   Follow Up Time: 4-6 Days

## 2022-10-10 NOTE — ED PROVIDER NOTE - OBJECTIVE STATEMENT
27-year-old female who presents with cough runny nose myalgia subjective fever started this morning.  No recent travel or sick contact.  Past denies nausea vomiting chest pain shortness of breath abdominal pain urinary symptoms diarrhea.  She took Tylenol prior to coming in.  Nothing makes it worse.

## 2022-10-10 NOTE — ED PROVIDER NOTE - NS ED ROS FT
Constitutional: +fever +myalgia  Eyes:  No visual changes  Ears:  No hearing changes  Neck: No neck pain  Cardiac:  No chest pain  Respiratory:  No SOB +cough  GI:  No abdominal pain, nausea, or vomiting  :  No dysuria  MS:  No back pain  Neuro:  No headache or weakness.  No LOC  Skin:  No skin rash

## 2022-10-10 NOTE — ED ADULT NURSE NOTE - DOES PATIENT HAVE ADVANCE DIRECTIVE
[facility] SURGICAL: [phone]                                              INTERAGENCY TRANSFER FORM - PHYSICIAN ORDERS   2017                    Hospital Admission Date: 2017  EDILSON SMITH   : 1945  Sex: Female        Attending Provider: Jozef Dyer MD     Allergies:  Combigan [Brimonidine Tartrate-timolol]    Infection:  None   Service:  INTERNAL MED    Ht:  --   Wt:  77.1 kg (170 lb)   Admission Wt:  77.1 kg (170 lb)    BMI:  --   BSA:  --            Patient PCP Information     Provider PCP Type    [facility]      ED Clinical Impression     Diagnosis Description Comment Added By Time Added    Acute post-operative pain [G89.18] Acute post-operative pain [G89.18]  Ben Borges MD 2017 10:19 PM    Weakness of right leg [R29.898] Weakness of right leg [R29.898]  Ben Borges MD 2017 10:19 PM      Hospital Problems as of 2017              Priority Class Noted POA    Chronic kidney disease, stage III (moderate) Medium  2013 Yes    Hyperlipidemia Medium  2013 Yes    HTN (hypertension) Medium  2016 Yes    Gastroesophageal reflux disease Medium  2017 Yes    * (Principal)Weakness Medium  2017 Yes    Anemia Medium  2017 Unknown      Non-Hospital Problems as of 2017              Priority Class Noted    S/P revision of total hip Medium  2017      Code Status History     Date Active Date Inactive Code Status Order ID Comments User Context    2017 11:53 AM  Full Code [id number]  Callie Valles PA-C Outpatient    2017 10:01 AM 2017 11:53 AM Full Code [id number]  Callie Valles PA-C Inpatient    2017  4:32 PM 2017  5:37 PM Full Code [id number]  Jozef Garcia MD Inpatient         Medication Review      CONTINUE these medications which may have CHANGED, or have new prescriptions. If we are uncertain of the size of tablets/capsules you have  at home, strength may be listed as something that might have changed.        Dose / Directions Comments    senna-docusate 8.6-50 MG per tablet   Commonly known as:  SENOKOT-S;PERICOLACE   This may have changed:  how much to take   Used for:  S/P revision of total hip        Dose:  2 tablet   Take 2 tablets by mouth 2 times daily   Quantity:  100 tablet   Refills:  0        * TYLENOL ARTHRITIS PAIN PO   This may have changed:  Another medication with the same name was added. Make sure you understand how and when to take each.        Dose:  650-1300 mg   Take 650-1,300 mg by mouth once as needed   Refills:  0        * acetaminophen 325 MG tablet   Commonly known as:  TYLENOL   This may have changed:  You were already taking a medication with the same name, and this prescription was added. Make sure you understand how and when to take each.        Dose:  650 mg   Take 2 tablets (650 mg) by mouth every 6 hours as needed for mild pain   Quantity:  100 tablet   Refills:  0        * Notice:  This list has 2 medication(s) that are the same as other medications prescribed for you. Read the directions carefully, and ask your doctor or other care provider to review them with you.      CONTINUE these medications which have NOT CHANGED        Dose / Directions Comments    aspirin  MG EC tablet        Dose:  325 mg   Take 1 tablet (325 mg) by mouth daily   Quantity:  40 tablet   Refills:  0        bimatoprost 0.01 % Soln   Commonly known as:  LUMIGAN        Dose:  1 drop   Place 1 drop into both eyes At Bedtime   Refills:  0        COSOPT 2-0.5 % ophthalmic solution   Generic drug:  dorzolamide-timolol        Dose:  1 drop   Place 1 drop into both eyes 2 times daily   Refills:  0        FISH OIL PO        Dose:  1200 mg   Take 1,200 mg by mouth daily   Refills:  0        HYDROcodone-acetaminophen 5-325 MG per tablet   Commonly known as:  NORCO   Used for:  S/P revision of total hip        Dose:  1-2 tablet   Take 1-2  tablets by mouth every 4 hours as needed for moderate to severe pain   Quantity:  50 tablet   Refills:  0        hydrOXYzine 25 MG tablet   Commonly known as:  ATARAX   Used for:  S/P revision of total hip        Dose:  25 mg   Take 1 tablet (25 mg) by mouth every 4 hours as needed for itching or anxiety   Quantity:  50 tablet   Refills:  1        LOSARTAN POTASSIUM PO        Dose:  50 mg   Take 50 mg by mouth daily   Refills:  0        Multi-vitamin Tabs tablet        Dose:  1 tablet   Take 1 tablet by mouth daily   Refills:  0        OMEPRAZOLE PO        Dose:  20 mg   Take 20 mg by mouth daily   Refills:  0                  Further instructions from your care team       Please ensure that you do NOT take MORE than 4 grams or 4000mg of acetaminophen in a 24 hour period.  Please note that there is 325mg of acetaminophen in every Norco pill.    Please follow up with orthopedics as instructed prior to your discharge on Sunday.    Summary of Visit     Reason for your hospital stay       Instability s/p hip replacement             After Care     Activity - Up with assistive device           Advance Diet as Tolerated       Follow this diet upon discharge: Orders Placed This Encounter      Regular Diet Adult       General info for SNF       Length of Stay Estimate: Short Term Care: Estimated # of Days <30  Condition at Discharge: Improving  Level of care:skilled   Rehabilitation Potential: Excellent  Admission H&P remains valid and up-to-date: Yes  Recent Chemotherapy: N/A  Use Nursing Home Standing Orders: Yes       Mantoux instructions       Give two-step Mantoux (PPD) Per Facility Policy Yes             Referrals     Occupational Therapy Adult Consult       Evaluate and treat as clinically indicated.    Reason:  S/p hip replacement       Physical Therapy Adult Consult       Evaluate and treat as clinically indicated.    Reason:  S/p hip replacement             Statement of Approval     Ordered          11/20/17 1207   I have reviewed and agree with all the recommendations and orders detailed in this document.  EFFECTIVE NOW     Approved and electronically signed by:  Callie Valles PA-C                Information provided/No

## 2022-10-10 NOTE — ED PROVIDER NOTE - CLINICAL SUMMARY MEDICAL DECISION MAKING FREE TEXT BOX
Patient presented with cough, runny nose, myalgias and subjective fevers. Otherwise afebrile, HD stable, very well appearing, lungs clear, abd non-tender, normal O2 saturation on RA. EKG obtained and non-ischemic. Chest xray negative for pneumothorax, pneumonia, widened mediastinum, evidence of rib fractures, enlarged cardiac silhouette or any other emergent pathologies. Likely viral URI. Will swab, recommend outpatient follow up. Patient agreeable with plan. Agrees to return to ED for any new or worsening symptoms.

## 2022-10-10 NOTE — ED ADULT TRIAGE NOTE - WEIGHT IN KG
104.3
Bed in lowest position, wheels locked, appropriate side rails in place/Call bell, personal items and telephone in reach/Instruct patient to call for assistance before getting out of bed or chair/Non-slip footwear when patient is out of bed/[place] to call system/Physically safe environment - no spills, clutter or unnecessary equipment/Purposeful Proactive Rounding/Room/bathroom lighting operational, light cord in reach

## 2022-10-10 NOTE — ED ADULT TRIAGE NOTE - CHIEF COMPLAINT QUOTE
As per pt, "Starting around 4am today, I started to feel pains in my arms and legs and now my back hurts. I also have a headache and fever." Pt with runny nose in triage.

## 2022-10-10 NOTE — ED PROVIDER NOTE - PATIENT PORTAL LINK FT
You can access the FollowMyHealth Patient Portal offered by [facility] by registering at the following website: [email or web address]. By joining [employer]’s FollowMyHealth portal, you will also be able to view your health information using other applications (apps) compatible with our system.

## 2022-10-20 ENCOUNTER — EMERGENCY (EMERGENCY)
Facility: HOSPITAL | Age: 27
LOS: 0 days | Discharge: HOME | End: 2022-10-20
Attending: EMERGENCY MEDICINE | Admitting: EMERGENCY MEDICINE

## 2022-10-20 VITALS
RESPIRATION RATE: 18 BRPM | DIASTOLIC BLOOD PRESSURE: 85 MMHG | SYSTOLIC BLOOD PRESSURE: 126 MMHG | HEART RATE: 90 BPM | TEMPERATURE: 98 F | HEIGHT: 65 IN | OXYGEN SATURATION: 99 %

## 2022-10-20 DIAGNOSIS — J45.909 UNSPECIFIED ASTHMA, UNCOMPLICATED: ICD-10-CM

## 2022-10-20 DIAGNOSIS — K02.9 DENTAL CARIES, UNSPECIFIED: ICD-10-CM

## 2022-10-20 DIAGNOSIS — Z91.018 ALLERGY TO OTHER FOODS: ICD-10-CM

## 2022-10-20 DIAGNOSIS — K08.89 OTHER SPECIFIED DISORDERS OF TEETH AND SUPPORTING STRUCTURES: ICD-10-CM

## 2022-10-20 DIAGNOSIS — Z90.09 ACQUIRED ABSENCE OF OTHER PART OF HEAD AND NECK: ICD-10-CM

## 2022-10-20 DIAGNOSIS — Z98.890 OTHER SPECIFIED POSTPROCEDURAL STATES: Chronic | ICD-10-CM

## 2022-10-20 DIAGNOSIS — Z90.89 ACQUIRED ABSENCE OF OTHER ORGANS: Chronic | ICD-10-CM

## 2022-10-20 PROCEDURE — 99284 EMERGENCY DEPT VISIT MOD MDM: CPT

## 2022-10-20 RX ORDER — AMOXICILLIN 250 MG/5ML
1 SUSPENSION, RECONSTITUTED, ORAL (ML) ORAL
Qty: 30 | Refills: 0
Start: 2022-10-20 | End: 2022-10-29

## 2022-10-20 RX ORDER — AMOXICILLIN 250 MG/5ML
500 SUSPENSION, RECONSTITUTED, ORAL (ML) ORAL ONCE
Refills: 0 | Status: COMPLETED | OUTPATIENT
Start: 2022-10-20 | End: 2022-10-20

## 2022-10-20 RX ORDER — IBUPROFEN 200 MG
1 TABLET ORAL
Qty: 15 | Refills: 0
Start: 2022-10-20 | End: 2022-10-24

## 2022-10-20 RX ADMIN — Medication 500 MILLIGRAM(S): at 21:30

## 2022-10-20 NOTE — ED PROVIDER NOTE - NSFOLLOWUPCLINICS_GEN_ALL_ED_FT
[facility]  Dental  [address] [place] [id number]  Phone: [phone]  Fax:   Follow Up Time: 1-3 Days

## 2022-10-20 NOTE — ED PROVIDER NOTE - NS ED ATTENDING STATEMENT MOD
This was a shared visit with the DAJA. I reviewed and verified the documentation and independently performed the documented:

## 2022-10-20 NOTE — ED PROVIDER NOTE - CLINICAL SUMMARY MEDICAL DECISION MAKING FREE TEXT BOX
27yoF with h/o asthma, presents with pain to tooth #29 x 2 wks, worsened today after eating pizza. Radiates to ear. On ROS reports mild ST. Denies fever and all other symptoms. On exam, afebrile, hemodynamically stable, saturating well, uncomfortable appearing, tearful, no WOB, speaking full sentences, head NCAT, EOMI grossly, anicteric, MMM, no oropharyngeal lesions/exudates, TM's clear with sharp reflexes b/l, no cervical LAD, noted impacted tooth #29, no facial asymmetry/induration/discoloration, RRR, breathing comfortably on RA, AAO, CN's 3-12 grossly intact, BUSTAMANTE spontaneously, skin warm, well perfused, no rashes or hives. No e/o airway or systemic involvement. No e/o facial abscess. Character c/w dentalgia. Seen by dental and blocked successfully. Patient is well appearing, NAD, afebrile, hemodynamically stable. Discharged with abx, instructions in further symptomatic care, return precautions, and need for dental f/u.

## 2022-10-20 NOTE — CONSULT NOTE ADULT - SUBJECTIVE AND OBJECTIVE BOX
Patient is a 27y old  Female who presents with a chief complaint of dental pain on right side    HPI: Patient complaining of toothache that started today and it's sharp shooting pain. Patient states that the pain irradiates for the upper and lower right  side.      PAST MEDICAL & SURGICAL HISTORY:  Asthma  last attack 4 years ago      S/P tonsillectomy      H/O foot surgery        ( -  ) heart valve replacement  ( -  ) joint replacement  ( -  ) pregnancy    MEDICATIONS  (STANDING):    MEDICATIONS  (PRN):      Allergies    lettuce (Other)  No Known Drug Allergies    Intolerances        FAMILY HISTORY:      *SOCIAL HISTORY: ( -  ) Tobacco; ( -  ) ETOH    *Last Dental Visit:    Vital Signs Last 24 Hrs  T(C): 36.8 (20 Oct 2022 19:18), Max: 36.8 (20 Oct 2022 19:18)  T(F): 98.2 (20 Oct 2022 19:18), Max: 98.2 (20 Oct 2022 19:18)  HR: 90 (20 Oct 2022 19:18) (90 - 90)  BP: 126/85 (20 Oct 2022 19:18) (126/85 - 126/85)  BP(mean): --  RR: 18 (20 Oct 2022 19:18) (18 - 18)  SpO2: 99% (20 Oct 2022 19:18) (99% - 99%)    Parameters below as of 20 Oct 2022 19:18  Patient On (Oxygen Delivery Method): room air        LABS:                  EOE:  TMJ ( -  ) clicks                     ( -  ) pops                     ( -  ) crepitus             Mandible <<FROM>>             Facial bones and MOM <<grossly intact>>             ( -  ) trismus             ( -  ) lymphadenopathy             ( -  ) swelling             ( -  ) asymmetry             ( -  ) palpation             ( -  ) dyspnea             ( -  ) dysphagia             ( -  ) loss of consciousness    IOE:  <<permanent>> dentition: <<multiple carious teeth>>            hard/soft palate:  ( -  ) palatal torus, <<No pathology noted>>           tongue/FOM <<No pathology noted>>           labial/buccal mucosa <<No pathology noted>>           ( +  ) percussion           ( -  ) palpation           ( -  ) swelling            ( -  ) abscess           ( -  ) sinus tract    Dentition present: <<  Y >>  Mobility: << N >>  Caries: << Y  >>         *DENTAL RADIOGRAPHS: none    RADIOLOGY & ADDITIONAL STUDIES:    *ASSESSMENT: Patient presents with dental pain on the right side, no signs of swelling or lymphadenopathy. Upon evaluation Tooth #1 Had DO caries, grossly decayed possibly into the pulp, positive for percussion and replicating patients symptoms of sharp shooting pain. Tooth #1 looks none restorable and most likely will need extraction. No swelling or abscess present. Tooth #31 has O caries tender for percussion but does not replicate patient's symptoms. patient explained that she will need to follow up with her dentist for x-rays, diagnosis and treatment.      *PLAN: Local anesthesia for tooth #1 to manage pain, ibuprofen 60mmg, amoxicillin 500mg. Informed Patient that tooth #1 most likely will need extraction    PROCEDURE:   Verbal and written consent given.  Anesthesia: << Administered 1x Carpule Marcaine 0.5% 1:200,000 epinephrine via buccal around tooth #1 and 1x Carpule 4% Septocaine 1:100,000 via buccal and palatal infiltration for tooth #1.>>        RECOMMENDATIONS:  1) <<Amoxicillin 500 mg tablet, quantity 21 tablets, 7 day supply. Sig: Take one tablet by oral route every 8 hours for 7 days.   Ibuprofen 600 mg tablet, quantity 20, 5 day supply. Sig: take 1 tablet by oral route every 6 hours as needed for pain. Take with food.    >>  2) Dental F/U with outpatient dentist for comprehensive dental care.   3) If any difficulty swallowing/breathing, fever occur, return to ER.     Resident Name: Olman Allred DMD, pager #[phone]

## 2022-10-20 NOTE — ED PROVIDER NOTE - PHYSICAL EXAMINATION
Vital Signs: I have reviewed the initial vital signs.  Constitutional: well-nourished, appears stated age, no acute distress  Heent; + tenderness noted to Tooth # 1 with extensive decay with radiation of pain to lower mandible; no abscess appreciated; no mastoid tenderness; no ear pain;   no cervical adenopathy  Lungs clear to aus;

## 2022-10-20 NOTE — ED ADULT NURSE NOTE - NSIMPLEMENTINTERV_GEN_ALL_ED
Implemented All Universal Safety Interventions:  [place] to call system. Call bell, personal items and telephone within reach. Instruct patient to call for assistance. Room bathroom lighting operational. Non-slip footwear when patient is off stretcher. Physically safe environment: no spills, clutter or unnecessary equipment. Stretcher in lowest position, wheels locked, appropriate side rails in place.

## 2022-10-20 NOTE — ED PROVIDER NOTE - ATTENDING APP SHARED VISIT CONTRIBUTION OF CARE
27yoF with h/o asthma, presents with pain to tooth #29 x 2 wks, worsened today after eating pizza. Radiates to ear. On ROS reports mild ST. Denies fever and all other symptoms. On exam, afebrile, hemodynamically stable, saturating well, uncomfortable appearing, tearful, no WOB, speaking full sentences, head NCAT, EOMI grossly, anicteric, MMM, no oropharyngeal lesions/exudates, TM's clear with sharp reflexes b/l, no cervical LAD, noted impacted tooth #29, no facial asymmetry/induration/discoloration, RRR, breathing comfortably on RA, AAO, CN's 3-12 grossly intact, BUSTAMANTE spontaneously, skin warm, well perfused, no rashes or hives. No e/o airway or systemic involvement. No e/o facial abscess. Character c/w dentalgia. Seen by dental 27yoF with h/o asthma, presents with pain to tooth #29 x 2 wks, worsened today after eating pizza. Radiates to ear. On ROS reports mild ST. Denies fever and all other symptoms. On exam, afebrile, hemodynamically stable, saturating well, uncomfortable appearing, tearful, no WOB, speaking full sentences, head NCAT, EOMI grossly, anicteric, MMM, no oropharyngeal lesions/exudates, TM's clear with sharp reflexes b/l, no cervical LAD, noted impacted tooth #29, no facial asymmetry/induration/discoloration, RRR, breathing comfortably on RA, AAO, CN's 3-12 grossly intact, BUSTAMANTE spontaneously, skin warm, well perfused, no rashes or hives. No e/o airway or systemic involvement. No e/o facial abscess. Character c/w dentalgia. Seen by dental and blocked successfully. Patient is well appearing, NAD, afebrile, hemodynamically stable. Discharged with abx, instructions in further symptomatic care, return precautions, and need for dental f/u.

## 2022-10-20 NOTE — ED PROVIDER NOTE - NS ED ROS FT
Constitutional: (-) fever  Cardiovascular: (-) syncope  Integumentary: (-) rash  Heent: dental pain x 1 hrs  Neurological: (-) altered mental status

## 2022-10-20 NOTE — ED PROVIDER NOTE - PATIENT PORTAL LINK FT
You can access the FollowMyHealth Patient Portal offered by [facility] by registering at the following website: [email or web address]. By joining [employer]’s FollowMyHealth portal, you will also be able to view your health information using other applications (apps) compatible with our system.

## 2022-10-20 NOTE — ED PROVIDER NOTE - OBJECTIVE STATEMENT
27 yold female to Ed Pmhx asthma c/o toothache to right side face started ~1 hr ago after eating Pizza; pt unsure upper or lower mandible; pt took motrin for pain without signif relief; pt denies fever, chills, cough, sob;

## 2022-10-21 ENCOUNTER — EMERGENCY (EMERGENCY)
Facility: HOSPITAL | Age: 27
LOS: 0 days | Discharge: HOME | End: 2022-10-21
Attending: STUDENT IN AN ORGANIZED HEALTH CARE EDUCATION/TRAINING PROGRAM | Admitting: EMERGENCY MEDICINE

## 2022-10-21 VITALS
RESPIRATION RATE: 18 BRPM | OXYGEN SATURATION: 99 % | DIASTOLIC BLOOD PRESSURE: 82 MMHG | SYSTOLIC BLOOD PRESSURE: 128 MMHG | HEART RATE: 87 BPM | HEIGHT: 65 IN | WEIGHT: 229.94 LBS | TEMPERATURE: 99 F

## 2022-10-21 DIAGNOSIS — J45.909 UNSPECIFIED ASTHMA, UNCOMPLICATED: ICD-10-CM

## 2022-10-21 DIAGNOSIS — Z90.89 ACQUIRED ABSENCE OF OTHER ORGANS: Chronic | ICD-10-CM

## 2022-10-21 DIAGNOSIS — Z90.89 ACQUIRED ABSENCE OF OTHER ORGANS: ICD-10-CM

## 2022-10-21 DIAGNOSIS — K08.89 OTHER SPECIFIED DISORDERS OF TEETH AND SUPPORTING STRUCTURES: ICD-10-CM

## 2022-10-21 DIAGNOSIS — K02.9 DENTAL CARIES, UNSPECIFIED: ICD-10-CM

## 2022-10-21 DIAGNOSIS — Z91.018 ALLERGY TO OTHER FOODS: ICD-10-CM

## 2022-10-21 DIAGNOSIS — Z98.890 OTHER SPECIFIED POSTPROCEDURAL STATES: Chronic | ICD-10-CM

## 2022-10-21 PROCEDURE — 99284 EMERGENCY DEPT VISIT MOD MDM: CPT

## 2022-10-21 NOTE — ED PROVIDER NOTE - PHYSICAL EXAMINATION
Gen: NAD, AOx3  Head: NCAT  HEENT: TTP tooth #1&32 with no discharge/edema/erythema, PERRL, oral mucosa moist, normal conjunctiva, oropharynx clear without exudate or erythema  Lung: CTAB, no respiratory distress, no wheezing, rales, rhonchi  CV: normal s1/s2, rrr, Normal perfusion, pulses 2+ throughout  MSK: No edema, no visible deformities, full range of motion in all 4 extremities  Neuro: No focal neurologic deficits  Skin: No rash   Psych: normal affect

## 2022-10-21 NOTE — CONSULT NOTE ADULT - SUBJECTIVE AND OBJECTIVE BOX
Patient is a 27y old  Female who presents with a chief complaint of  dental pain    HPI:   Patient had been in pain for past week. Patient was seen in ED yesterday and was prescribed pain medication and antibiotic.     PAST MEDICAL & SURGICAL HISTORY:  Asthma  last attack 4 years ago      S/P tonsillectomy      H/O foot surgery        ( -  ) heart valve replacement  ( - ) joint replacement  ( -  ) pregnancy    MEDICATIONS  (STANDING):    MEDICATIONS  (PRN):      Allergies    lettuce (Other)  No Known Drug Allergies    Intolerances        FAMILY HISTORY:      *Last Dental Visit: unknown    Vital Signs Last 24 Hrs  T(C): 37.3 (21 Oct 2022 14:23), Max: 37.3 (21 Oct 2022 14:23)  T(F): 99.1 (21 Oct 2022 14:23), Max: 99.1 (21 Oct 2022 14:23)  HR: 87 (21 Oct 2022 14:23) (87 - 90)  BP: 128/82 (21 Oct 2022 14:23) (126/85 - 128/82)  BP(mean): --  RR: 18 (21 Oct 2022 14:23) (18 - 18)  SpO2: 99% (21 Oct 2022 14:23) (99% - 99%)    Parameters below as of 21 Oct 2022 14:23  Patient On (Oxygen Delivery Method): room air        LABS:                  EOE:  TMJ (  - ) clicks                     ( -  ) pops                     (  - ) crepitus             Mandible <<FROM>>             Facial bones and MOM <<grossly intact>>             ( + ) trismus             ( -  ) lymphadenopathy             ( -  ) swelling             ( -  ) asymmetry             ( -  ) palpation             ( -  ) dyspnea             ( -  ) dysphagia             ( -  ) loss of consciousness  Limited opening noted.  IOE:  <<permanent >> dentition: <<grossly intact>>             hard/soft palate:  (  - ) palatal torus, <<No pathology noted>>           tongue/FOM <<No pathology noted>>           labial/buccal mucosa <<No pathology noted>>           ( +  ) percussion           ( - ) palpation           ( -  ) swelling            ( -  ) abscess           ( -  ) sinus tract  #1,32 tender on palpation  Dentition present: << y  >>  Mobility: << n >>  Caries: <<n  >>       *DENTAL RADIOGRAPHS: 1 Panoramic, impacted #1,32 noted carious #1 noted    RADIOLOGY & ADDITIONAL STUDIES:    *ASSESSMENT:  Patient is a 27y old  Female who presents with a chief complaint of  dental pain, Patient had been in pain for past week. Limited opening noted., #1,32 tender on palpation, 1 Panoramic, impacted #1,32 noted, carious #1 noted    *PLAN: Informed #1,32 need to be extracted with oral surgeon. Recommend follow up with primary dentist and obtain referral for oral surgeon who take patient's insurance.  Patient understands. Instructed patient to finish antibiotic and pain medication from ED.    RECOMMENDATIONS:  1) Complete medication as prescribed  2) Dental F/U with outpatient dentist for comprehensive dental care.   3) If any difficulty swallowing/breathing, fever occur, return to ER.     Christopher Mir DDS, [phone]

## 2022-10-21 NOTE — ED PROVIDER NOTE - OBJECTIVE STATEMENT
26 yo female with no pmh presents c/o R upper dental pain for 2 days. pt denies any other symptoms including fevers, chill, headache, recent illness/travel, cough, abdominal pain, chest pain, or SOB.

## 2022-10-21 NOTE — ED PROVIDER NOTE - CLINICAL SUMMARY MEDICAL DECISION MAKING FREE TEXT BOX
27-year-old female presents with right upper dental pain for 2 days no fever chills nausea vomiting swelling.  Tooth 1 and 32. No submandibular or floor or mouth swelling, voice change, odynophagia or drooling, fever, chills, or inhaled tooth fragment.   Exam shows caries, no inflamed alveolus, no discharge of purulence or abscess.  A/P: Likely caries and dental infection. Will transfer to dental. No sign of deep infection, airway compromise, or spread into surrounding structures.

## 2022-10-21 NOTE — ED PROVIDER NOTE - NS ED ROS FT
Constitutional: (-) fever  Eyes/ENT: dental pain, (-) visual changes   Cardiovascular: (-) chest pain, (-) syncope  Respiratory: (-) cough, (-) shortness of breath  Gastrointestinal: (-) vomiting, (-) diarrhea  Genitourinary: (-) dysuria, (-) hesitancy, (-) frequency   Musculoskeletal: (-) neck pain, (-) back pain, (-) joint pain  Integumentary: (-) rash, (-) edema  Neurological: (-) headache, (-) altered mental status  Allergic/Immunologic: (-) pruritus

## 2023-02-01 NOTE — ED ADULT NURSE NOTE - NSICDXPASTSURGICALHX_GEN_ALL_CORE_FT
Pt called, went to urgent care, has bronchitis. Still has green mucous, coughing. Has been going on for about 3 weeks.  [facility] strong [facility] [address]
PAST SURGICAL HISTORY:  H/O foot surgery     S/P tonsillectomy
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
## 2024-01-02 ENCOUNTER — APPOINTMENT (OUTPATIENT)
Dept: ORTHOPEDIC SURGERY | Facility: CLINIC | Age: 29
End: 2024-01-02
Payer: MEDICAID

## 2024-01-02 DIAGNOSIS — S92.009S: ICD-10-CM

## 2024-01-02 DIAGNOSIS — M77.51 OTHER ENTHESOPATHY OF RT FOOT AND ANKLE: ICD-10-CM

## 2024-01-02 PROCEDURE — 73650 X-RAY EXAM OF HEEL: CPT | Mod: RT

## 2024-01-02 PROCEDURE — 20605 DRAIN/INJ JOINT/BURSA W/O US: CPT

## 2024-01-02 PROCEDURE — 99203 OFFICE O/P NEW LOW 30 MIN: CPT | Mod: 25

## 2024-01-02 NOTE — IMAGING
[de-identified] : Karen young woman sits comfortably in my office no distress.  Physical examination: Right foot: Traumatic and surgical incisions of healed.  She has tenderness palpation about the posterior aspects of her calcaneus about the Achilles insertion and in the retrocalcaneal bursa.  No undue swelling in the area.  No erythema duration or fluctuance.  Midfoot is maintained.  Radiographs: Right calcaneus (lateral, Teixeira axial): Healed right calcaneal fracture.  Slight varus alignment.
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
## 2024-01-02 NOTE — ASSESSMENT
[FreeTextEntry1] : Healed right tongue type right calcaneus fracture in slight varus with prominence of the posterior aspects of the calcaneus where the Achilles inserts.  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  She is tried NSAIDs without significant relief.  We talked about treatment options.  Organ to try cortisone injection and physical therapy working on Achilles tendon stretching exercises.  Modalities such as moist heat cryotherapy e-stim can utilize adjunct of therapy.  Therapy should work to teach her self-treated exercise program and stretching.  Will have her follow-up in my office in 3 months to see if this mitigates the pain.  If not then we can revisit it at this discussion of calcaneal osteotomy and reinsertion of her Achilles.  Procedure: With the patient's consent and at her request utilizing standard sterile technique patient was provided an injection of lidocaine 1% (2 cc), Marcaine 0.25% (2 cc) and Depo-Medrol 5 mcg/cc (2 cc) into the right retrocalcaneal bursa.  Patient injection without issue.  Postinjection precautions were discussed.

## 2024-03-07 NOTE — ED PROVIDER NOTE - CPE EDP ENMT NORM
Received request via: Patient    Was the patient seen in the last year in this department? Yes    Does the patient have an active prescription (recently filled or refills available) for medication(s) requested?  Please review last rx that was sent in     Pharmacy Name: [facility]     Does the patient have retirement Plus and need 100 day supply (blood pressure, diabetes and cholesterol meds only)? Patient does not have SCP   normal...

## 2024-05-03 ENCOUNTER — APPOINTMENT (OUTPATIENT)
Dept: ORTHOPEDIC SURGERY | Facility: CLINIC | Age: 29
End: 2024-05-03

## 2024-10-22 ENCOUNTER — APPOINTMENT (OUTPATIENT)
Dept: ORTHOPEDIC SURGERY | Facility: CLINIC | Age: 29
End: 2024-10-22

## 2024-10-31 ENCOUNTER — APPOINTMENT (OUTPATIENT)
Dept: ORTHOPEDIC SURGERY | Facility: CLINIC | Age: 29
End: 2024-10-31

## 2024-11-04 NOTE — ED ADULT TRIAGE NOTE - HEIGHT IN FEET
Continue current medications.  Follow up with specialists as scheduled.  Follow up in 3 months, sooner if needed.  
5

## 2024-11-19 ENCOUNTER — APPOINTMENT (OUTPATIENT)
Dept: ORTHOPEDIC SURGERY | Facility: CLINIC | Age: 29
End: 2024-11-19

## 2024-12-24 NOTE — ED ADULT NURSE NOTE - BREATHING, MLM
Problem: Discharge Planning  Goal: Discharge to home or other facility with appropriate resources  Outcome: Progressing     Problem: Safety - Adult  Goal: Free from fall injury  Outcome: Progressing     Problem: Pain  Goal: Verbalizes/displays adequate comfort level or baseline comfort level  Outcome: Progressing      Spontaneous, unlabored and symmetrical

## 2025-01-20 NOTE — ED PROVIDER NOTE - NS ED MD DISPO DISCHARGE
Size Of Lesion In Cm (Optional): 0
Instructions (Optional): Refer to  or  due to lesions size greater than 4cm
Detail Level: Detailed
Introduction Text (Please End With A Colon): Recommend:
Home

## 2025-02-27 NOTE — ED PROVIDER NOTE - NS_EDPROVIDERDISPOUSERTYPE_ED_A_ED
I have personally evaluated and examined the patient. The Attending was available to me as a supervising provider if needed. Detail Level: Detailed Detail Level: Simple

## 2025-03-25 NOTE — PRE-ANESTHESIA EVALUATION ADULT - NSDENTALSD_ENT_ALL_CORE
appears normal and intact severe hyperglycemia with glucose 672 with pseudohyponatremia. Normal AGAP and bicarb.   CXR  ( I personally review) no focal consolidation  UA dirty  due to not taking medication  Start lantus 12 unit stat and daily, premeal 4 unit tid, ISS and hypoglycemia protocol  Mild MICHELINE with Cr 1.65 ( 1.33 in 08/2024), continue IV fluid. Closely monitor serum electrolytes and renal function  Check A1c  Closely monitor glucose and titrate insulin regimens   Endo consulted

## 2025-06-09 ENCOUNTER — EMERGENCY (EMERGENCY)
Facility: HOSPITAL | Age: 30
LOS: 0 days | Discharge: HOME | End: 2025-06-09
Attending: EMERGENCY MEDICINE
Payer: MEDICAID

## 2025-06-09 VITALS
HEART RATE: 100 BPM | HEIGHT: 65 IN | SYSTOLIC BLOOD PRESSURE: 168 MMHG | DIASTOLIC BLOOD PRESSURE: 143 MMHG | TEMPERATURE: 99 F | WEIGHT: 190.04 LBS | RESPIRATION RATE: 20 BRPM | OXYGEN SATURATION: 100 %

## 2025-06-09 VITALS
RESPIRATION RATE: 17 BRPM | SYSTOLIC BLOOD PRESSURE: 108 MMHG | DIASTOLIC BLOOD PRESSURE: 71 MMHG | OXYGEN SATURATION: 98 % | HEART RATE: 67 BPM

## 2025-06-09 DIAGNOSIS — S01.311A LACERATION WITHOUT FOREIGN BODY OF RIGHT EAR, INITIAL ENCOUNTER: ICD-10-CM

## 2025-06-09 DIAGNOSIS — Z90.89 ACQUIRED ABSENCE OF OTHER ORGANS: Chronic | ICD-10-CM

## 2025-06-09 DIAGNOSIS — Z3A.08 8 WEEKS GESTATION OF PREGNANCY: ICD-10-CM

## 2025-06-09 DIAGNOSIS — S62.396A OTHER FRACTURE OF FIFTH METACARPAL BONE, RIGHT HAND, INITIAL ENCOUNTER FOR CLOSED FRACTURE: ICD-10-CM
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
## 2025-06-09 DIAGNOSIS — O9A.211 INJURY, POISONING AND CERTAIN OTHER CONSEQUENCES OF EXTERNAL CAUSES COMPLICATING PREGNANCY, FIRST TRIMESTER: ICD-10-CM

## 2025-06-09 DIAGNOSIS — R22.0 LOCALIZED SWELLING, MASS AND LUMP, HEAD: ICD-10-CM

## 2025-06-09 DIAGNOSIS — Z91.018 ALLERGY TO OTHER FOODS: ICD-10-CM

## 2025-06-09 DIAGNOSIS — R51.9 HEADACHE, UNSPECIFIED: ICD-10-CM
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
## 2025-06-09 DIAGNOSIS — Z98.890 OTHER SPECIFIED POSTPROCEDURAL STATES: Chronic | ICD-10-CM

## 2025-06-09 PROCEDURE — 73130 X-RAY EXAM OF HAND: CPT | Mod: RT

## 2025-06-09 PROCEDURE — 73100 X-RAY EXAM OF WRIST: CPT | Mod: 26,RT

## 2025-06-09 PROCEDURE — 73090 X-RAY EXAM OF FOREARM: CPT | Mod: RT

## 2025-06-09 PROCEDURE — 76857 US EXAM PELVIC LIMITED: CPT

## 2025-06-09 PROCEDURE — 99285 EMERGENCY DEPT VISIT HI MDM: CPT | Mod: 25

## 2025-06-09 PROCEDURE — 73090 X-RAY EXAM OF FOREARM: CPT | Mod: 26,RT

## 2025-06-09 PROCEDURE — 26605 TREAT METACARPAL FRACTURE: CPT | Mod: 54,RT

## 2025-06-09 PROCEDURE — 73070 X-RAY EXAM OF ELBOW: CPT | Mod: RT

## 2025-06-09 PROCEDURE — 12013 RPR F/E/E/N/L/M 2.6-5.0 CM: CPT | Mod: 59

## 2025-06-09 PROCEDURE — 73070 X-RAY EXAM OF ELBOW: CPT | Mod: 26,RT

## 2025-06-09 PROCEDURE — 76857 US EXAM PELVIC LIMITED: CPT | Mod: 26

## 2025-06-09 PROCEDURE — 73100 X-RAY EXAM OF WRIST: CPT | Mod: RT

## 2025-06-09 PROCEDURE — 26605 TREAT METACARPAL FRACTURE: CPT | Mod: RT

## 2025-06-09 PROCEDURE — 99284 EMERGENCY DEPT VISIT MOD MDM: CPT | Mod: 25,57

## 2025-06-09 PROCEDURE — 73060 X-RAY EXAM OF HUMERUS: CPT | Mod: RT

## 2025-06-09 PROCEDURE — 12013 RPR F/E/E/N/L/M 2.6-5.0 CM: CPT | Mod: XU

## 2025-06-09 PROCEDURE — 70486 CT MAXILLOFACIAL W/O DYE: CPT | Mod: 26

## 2025-06-09 PROCEDURE — 73130 X-RAY EXAM OF HAND: CPT | Mod: 26,RT,76

## 2025-06-09 PROCEDURE — 70486 CT MAXILLOFACIAL W/O DYE: CPT

## 2025-06-09 PROCEDURE — 73060 X-RAY EXAM OF HUMERUS: CPT | Mod: 26,RT

## 2025-06-09 PROCEDURE — 76942 ECHO GUIDE FOR BIOPSY: CPT

## 2025-06-09 RX ORDER — ACETAMINOPHEN 500 MG/5ML
650 LIQUID (ML) ORAL ONCE
Refills: 0 | Status: COMPLETED | OUTPATIENT
Start: 2025-06-09 | End: 2025-06-09

## 2025-06-09 RX ORDER — LIDOCAINE HCL/PF 10 MG/ML
8 VIAL (ML) INJECTION ONCE
Refills: 0 | Status: DISCONTINUED | OUTPATIENT
Start: 2025-06-09 | End: 2025-06-09

## 2025-06-09 RX ADMIN — Medication 650 MILLIGRAM(S): at 11:05

## 2025-06-09 NOTE — ED PROCEDURE NOTE - NS ED PERI NEURO POS
Pre-application: Motor, sensory, and vascular responses NOT intact in the injured extremity./The patient/caregiver verbalized understanding of how to care for the injured extremity with splint.

## 2025-06-09 NOTE — ED PROVIDER NOTE - CARE PROVIDER_API CALL
Alfa Salcedo  Orthopaedic Surgery  [address] [place] [address]  Phone: [phone]  Fax: [phone]  Established Patient  Follow Up Time: 1-3 Days

## 2025-06-09 NOTE — PROCEDURAL SAFETY CHECKLIST WITH OR WITHOUT SEDATION - NSPREPROCFT_GEN_ALL_CORE
[FreeTextEntry1] : -- I have advised them that I am transitioning away from being a PCP and they will find a new PCP. I will remain their PCP until they transition to a new PCP. I will remain their nephrologist. Information given and referral made.   # Nephrolithiasis.  * Maintain high fluid intake.  * Start potassium citrate 10 meq twice daily.   * Recheck labs.  # Hyperchlesterolemia.  * Recheck lipid profile.  # Hematuria.  * C/w thin basement membrane (Alport syndrome).  * Recheck labs.   Right Ulnar nerve block
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
## 2025-06-09 NOTE — ED PROCEDURE NOTE - CPROC ED TIME OUT STATEMENT1
“Patient's name, , procedure and correct site were confirmed during the [place] Timeout.”
“Patient's name, , procedure and correct site were confirmed during the [place] Timeout.”
“Patient's name, , procedure and correct site were confirmed during the [place] Timeout.”

## 2025-06-09 NOTE — ED PROVIDER NOTE - PATIENT PORTAL LINK FT
You can access the FollowMyHealth Patient Portal offered by [facility] by registering at the following website: [email or web address]. By joining [employer]’s FollowMyHealth portal, you will also be able to view your health information using other applications (apps) compatible with our system.

## 2025-06-09 NOTE — ED PROVIDER NOTE - NSFOLLOWUPINSTRUCTIONS_ED_ALL_ED_FT
Our Emergency Department Referral Coordinators will be reaching out to you in the next 24-48 hours from 9:00am to 5:00pm to schedule a follow up appointment. Please expect a phone call from the hospital in that time frame. If you do not receive a call or if you have any questions or concerns, you can reach them at   [phone]     Laceration- PLEASE FOLLOW UP WITH AN OB AND A HAND SPECIALIST    A laceration is a cut that goes through all of the layers of the skin and into the tissue that is right under the skin. Some lacerations heal on their own. Others need to be closed with skin adhesive strips, skin glue, stitches (sutures), or staples. Proper laceration care minimizes the risk of infection and helps the laceration to heal better.  If non-absorbable stitches or staples have been placed, they must be taken out within the time frame instructed by your healthcare provider.    SEEK IMMEDIATE MEDICAL CARE IF YOU HAVE ANY OF THE FOLLOWING SYMPTOMS: swelling around the wound, worsening pain, drainage from the wound, red streaking going away from your wound, inability to move finger or toe near the laceration, or discoloration of skin near the laceration.     Fracture    A fracture is a break in one of your bones. This can occur from a variety of injuries, especially traumatic ones. Symptoms include pain, bruising, or swelling. Do not use the injured limb. If a fracture is in one of the bones below your waist, do not put weight on that limb unless instructed to do so by your healthcare provider. Crutches or a cane may have been provided. A splint or cast may have been applied by your health care provider. Make sure to keep it dry and follow up with an orthopedist as instructed.    SEEK IMMEDIATE MEDICAL CARE IF YOU HAVE ANY OF THE FOLLOWING SYMPTOMS: numbness, tingling, increasing pain, or weakness in any part of the injured limb.
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
## 2025-06-09 NOTE — ED PROVIDER NOTE - PHYSICAL EXAMINATION
CONSTITUTIONAL: NAD  SKIN: Warm dry  HEAD: NCAT  EYES: left periorbital swelling, pupils RAQUEL, EOM intact. no proptosis or racoon eyes  ENT: MMM. Laceration behind right ear  NECK: Supple; non tender.  CARD: RRR  RESP: No respiratory distress, lung sounds clear bilaterally  ABD: S/NT no R/G  EXT: no pedal edema, no calf tenderness. swelling and tenderness to the hypothenar eminence of the right hand. Pulse, motor and sensory intact   NEURO: Grossly unremarkable  PSYCH: Cooperative, appropriate.

## 2025-06-09 NOTE — ED PROCEDURE NOTE - CPROC ED POST PROC CARE GUIDE1
Verbal/written post procedure instructions were given to patient/caregiver./Instructed patient/caregiver to follow-up with primary care physician./Instructed patient/caregiver regarding signs and symptoms of infection./Keep the cast/splint/dressing clean and dry.
Verbal/written post procedure instructions were given to patient/caregiver./Instructed patient/caregiver to follow-up with primary care physician./Instructed patient/caregiver regarding signs and symptoms of infection.
Verbal/written post procedure instructions were given to patient/caregiver./Instructed patient/caregiver to follow-up with primary care physician./Elevate the injured extremity as instructed./Keep the cast/splint/dressing clean and dry.
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
## 2025-06-19 NOTE — CHART NOTE - NSCHARTNOTEFT_GEN_A_CORE
[facility] MRN [id number]  -  6/ 10 2nd attempt-  6/11. appt requested / Dr Lara / Called [phone], [phone], & [phone], and all went to . Will try again later to make appt 6/18 & 6/19 - STEVE / Appointment made - JL / [address] [facility] [address] / DONATO RICHARDSON / Fri 06/20/2025 09:15 AM -    SPECIALTY: hand surgery
Ed attempted to assist patient with Outpatient follow up. Unable to reach patient.

## 2025-06-20 ENCOUNTER — APPOINTMENT (OUTPATIENT)
Dept: PLASTIC SURGERY | Facility: CLINIC | Age: 30
End: 2025-06-20

## 2025-06-24 ENCOUNTER — NON-APPOINTMENT (OUTPATIENT)
Age: 30
End: 2025-06-24

## 2025-06-24 ENCOUNTER — LABORATORY RESULT (OUTPATIENT)
Age: 30
End: 2025-06-24

## 2025-06-24 ENCOUNTER — APPOINTMENT (OUTPATIENT)
Dept: OBGYN | Facility: CLINIC | Age: 30
End: 2025-06-24
Payer: MEDICAID

## 2025-06-24 ENCOUNTER — OUTPATIENT (OUTPATIENT)
Dept: OUTPATIENT SERVICES | Facility: HOSPITAL | Age: 30
LOS: 1 days | End: 2025-06-24
Payer: MEDICAID

## 2025-06-24 ENCOUNTER — APPOINTMENT (OUTPATIENT)
Dept: OBGYN | Facility: CLINIC | Age: 30
End: 2025-06-24

## 2025-06-24 VITALS
BODY MASS INDEX: 31.08 KG/M2 | WEIGHT: 186.56 LBS | DIASTOLIC BLOOD PRESSURE: 79 MMHG | SYSTOLIC BLOOD PRESSURE: 120 MMHG | HEIGHT: 65 IN

## 2025-06-24 DIAGNOSIS — Z98.890 OTHER SPECIFIED POSTPROCEDURAL STATES: Chronic | ICD-10-CM

## 2025-06-24 DIAGNOSIS — Z90.89 ACQUIRED ABSENCE OF OTHER ORGANS: Chronic | ICD-10-CM

## 2025-06-24 DIAGNOSIS — Z34.90 ENCOUNTER FOR SUPERVISION OF NORMAL PREGNANCY, UNSPECIFIED, UNSPECIFIED TRIMESTER: ICD-10-CM

## 2025-06-24 PROCEDURE — 87491 CHLMYD TRACH DNA AMP PROBE: CPT

## 2025-06-24 PROCEDURE — 76815 OB US LIMITED FETUS(S): CPT | Mod: 26

## 2025-06-24 PROCEDURE — 87661 TRICHOMONAS VAGINALIS AMPLIF: CPT

## 2025-06-24 PROCEDURE — 99204 OFFICE O/P NEW MOD 45 MIN: CPT | Mod: 25

## 2025-06-24 PROCEDURE — 81002 URINALYSIS NONAUTO W/O SCOPE: CPT

## 2025-06-24 PROCEDURE — 99214 OFFICE O/P EST MOD 30 MIN: CPT | Mod: 25,26

## 2025-06-24 PROCEDURE — 88142 CYTOPATH C/V THIN LAYER: CPT

## 2025-06-24 PROCEDURE — 87591 N.GONORRHOEAE DNA AMP PROB: CPT

## 2025-06-24 PROCEDURE — 76815 OB US LIMITED FETUS(S): CPT

## 2025-06-24 RX ORDER — ONDANSETRON 4 MG/1
4 TABLET, ORALLY DISINTEGRATING ORAL EVERY 6 HOURS
Qty: 20 | Refills: 0 | Status: ACTIVE | COMMUNITY
Start: 2025-06-24 | End: 1900-01-01

## 2025-06-25 ENCOUNTER — NON-APPOINTMENT (OUTPATIENT)
Age: 30
End: 2025-06-25

## 2025-06-25 DIAGNOSIS — Z34.90 ENCOUNTER FOR SUPERVISION OF NORMAL PREGNANCY, UNSPECIFIED, UNSPECIFIED TRIMESTER: ICD-10-CM

## 2025-06-25 LAB
BILIRUB UR QL STRIP: NEGATIVE
CLARITY UR: CLEAR
COLLECTION METHOD: NORMAL
GLUCOSE UR-MCNC: NEGATIVE
HCG UR QL: NEGATIVE EU/DL
HGB UR QL STRIP.AUTO: NEGATIVE
KETONES UR-MCNC: NORMAL
LEUKOCYTE ESTERASE UR QL STRIP: NEGATIVE
NITRITE UR QL STRIP: NEGATIVE
PH UR STRIP: 6.5
PROT UR STRIP-MCNC: NEGATIVE
SP GR UR STRIP: 1.02

## 2025-06-27 ENCOUNTER — OUTPATIENT (OUTPATIENT)
Dept: OUTPATIENT SERVICES | Facility: HOSPITAL | Age: 30
LOS: 1 days | End: 2025-06-27

## 2025-06-27 DIAGNOSIS — Z90.89 ACQUIRED ABSENCE OF OTHER ORGANS: Chronic | ICD-10-CM

## 2025-06-27 DIAGNOSIS — Z34.90 ENCOUNTER FOR SUPERVISION OF NORMAL PREGNANCY, UNSPECIFIED, UNSPECIFIED TRIMESTER: ICD-10-CM

## 2025-06-27 DIAGNOSIS — Z98.890 OTHER SPECIFIED POSTPROCEDURAL STATES: Chronic | ICD-10-CM

## 2025-06-28 DIAGNOSIS — Z34.90 ENCOUNTER FOR SUPERVISION OF NORMAL PREGNANCY, UNSPECIFIED, UNSPECIFIED TRIMESTER: ICD-10-CM

## 2025-07-01 ENCOUNTER — OUTPATIENT (OUTPATIENT)
Dept: OUTPATIENT SERVICES | Facility: HOSPITAL | Age: 30
LOS: 1 days | End: 2025-07-01
Payer: MEDICAID

## 2025-07-01 ENCOUNTER — LABORATORY RESULT (OUTPATIENT)
Age: 30
End: 2025-07-01

## 2025-07-01 DIAGNOSIS — Z90.89 ACQUIRED ABSENCE OF OTHER ORGANS: Chronic | ICD-10-CM

## 2025-07-01 PROCEDURE — 86900 BLOOD TYPING SEROLOGIC ABO: CPT

## 2025-07-01 PROCEDURE — 86803 HEPATITIS C AB TEST: CPT

## 2025-07-01 PROCEDURE — 86480 TB TEST CELL IMMUN MEASURE: CPT

## 2025-07-01 PROCEDURE — 81220 CFTR GENE COM VARIANTS: CPT

## 2025-07-01 PROCEDURE — 81001 URINALYSIS AUTO W/SCOPE: CPT

## 2025-07-01 PROCEDURE — 81443 GENETIC TSTG SEVERE INH COND: CPT

## 2025-07-01 PROCEDURE — 81257 HBA1/HBA2 GENE: CPT

## 2025-07-01 PROCEDURE — 81422 FETAL CHRMOML MICRODELTJ: CPT

## 2025-07-01 PROCEDURE — 81329 SMN1 GENE DOS/DELETION ALYS: CPT

## 2025-07-01 PROCEDURE — 86787 VARICELLA-ZOSTER ANTIBODY: CPT

## 2025-07-01 PROCEDURE — 81420 FETAL CHRMOML ANEUPLOIDY: CPT

## 2025-07-01 PROCEDURE — 86735 MUMPS ANTIBODY: CPT

## 2025-07-01 PROCEDURE — 87340 HEPATITIS B SURFACE AG IA: CPT

## 2025-07-01 PROCEDURE — 87389 HIV-1 AG W/HIV-1&-2 AB AG IA: CPT

## 2025-07-01 PROCEDURE — 85025 COMPLETE CBC W/AUTO DIFF WBC: CPT

## 2025-07-01 PROCEDURE — 83655 ASSAY OF LEAD: CPT

## 2025-07-01 PROCEDURE — 87086 URINE CULTURE/COLONY COUNT: CPT

## 2025-07-01 PROCEDURE — 86780 TREPONEMA PALLIDUM: CPT

## 2025-07-01 PROCEDURE — 86762 RUBELLA ANTIBODY: CPT

## 2025-07-01 PROCEDURE — 86850 RBC ANTIBODY SCREEN: CPT

## 2025-07-01 PROCEDURE — 86765 RUBEOLA ANTIBODY: CPT

## 2025-07-01 PROCEDURE — 81222 CFTR GENE DUP/DELET VARIANTS: CPT

## 2025-07-01 PROCEDURE — 81243 FMR1 GEN ALY DETC ABNL ALLEL: CPT

## 2025-07-01 PROCEDURE — 86901 BLOOD TYPING SEROLOGIC RH(D): CPT

## 2025-07-02 LAB
ABORH: NORMAL
ANTIBODY SCREEN: NORMAL
APPEARANCE: ABNORMAL
BASOPHILS # BLD AUTO: 0.03 K/UL
BASOPHILS NFR BLD AUTO: 0.3 %
BILIRUBIN URINE: NEGATIVE
BLOOD URINE: NEGATIVE
COLOR: YELLOW
EOSINOPHIL # BLD AUTO: 0.07 K/UL
EOSINOPHIL NFR BLD AUTO: 0.7 %
GLUCOSE QUALITATIVE U: NEGATIVE MG/DL
HBV SURFACE AG SER QL: NONREACTIVE
HCT VFR BLD CALC: 38.8 %
HCV AB SER QL: NONREACTIVE
HCV S/CO RATIO: 0.05 COI
HGB BLD-MCNC: 12.5 G/DL
HIV1+2 AB SPEC QL IA.RAPID: NONREACTIVE
IMM GRANULOCYTES NFR BLD AUTO: 0.5 %
KETONES URINE: NEGATIVE MG/DL
LEUKOCYTE ESTERASE URINE: ABNORMAL
LYMPHOCYTES # BLD AUTO: 2.17 K/UL
LYMPHOCYTES NFR BLD AUTO: 20.9 %
MAN DIFF?: NORMAL
MCHC RBC-ENTMCNC: 29.5 PG
MCHC RBC-ENTMCNC: 32.2 G/DL
MCV RBC AUTO: 91.5 FL
MEV IGG FLD QL IA: 37.8 AU/ML
MEV IGG+IGM SER-IMP: POSITIVE
MONOCYTES # BLD AUTO: 0.6 K/UL
MONOCYTES NFR BLD AUTO: 5.8 %
MUV AB SER-ACNC: POSITIVE
MUV IGG SER QL IA: 39.8 AU/ML
NEUTROPHILS # BLD AUTO: 7.47 K/UL
NEUTROPHILS NFR BLD AUTO: 71.8 %
NITRITE URINE: NEGATIVE
PH URINE: 6.5
PLATELET # BLD AUTO: 275 K/UL
PROTEIN URINE: NEGATIVE MG/DL
RBC # BLD: 4.24 M/UL
RBC # FLD: 13.8 %
RUBV IGG FLD-ACNC: 3.03 INDEX
RUBV IGG SER-IMP: POSITIVE
SPECIFIC GRAVITY URINE: 1.01
T PALLIDUM AB SER QL IA: NEGATIVE
UROBILINOGEN URINE: 0.2 MG/DL
VZV AB TITR SER: POSITIVE
VZV IGG SER IF-ACNC: 1.59 S/CO
WBC # FLD AUTO: 10.39 K/UL

## 2025-07-03 DIAGNOSIS — Z34.90 ENCOUNTER FOR SUPERVISION OF NORMAL PREGNANCY, UNSPECIFIED, UNSPECIFIED TRIMESTER: ICD-10-CM

## 2025-07-04 DIAGNOSIS — Z34.90 ENCOUNTER FOR SUPERVISION OF NORMAL PREGNANCY, UNSPECIFIED, UNSPECIFIED TRIMESTER: ICD-10-CM

## 2025-07-06 LAB
BACTERIA UR CULT: NORMAL
LEAD BLD-MCNC: <1 UG/DL
M TB IFN-G BLD-IMP: NEGATIVE
QUANTIFERON TB PLUS MITOGEN MINUS NIL: 6.91 IU/ML
QUANTIFERON TB PLUS NIL: 0.02 IU/ML
QUANTIFERON TB PLUS TB1 MINUS NIL: 0.01 IU/ML
QUANTIFERON TB PLUS TB2 MINUS NIL: 0.01 IU/ML

## 2025-07-11 ENCOUNTER — NON-APPOINTMENT (OUTPATIENT)
Age: 30
End: 2025-07-11

## 2025-07-15 ENCOUNTER — ASOB RESULT (OUTPATIENT)
Age: 30
End: 2025-07-15

## 2025-07-15 ENCOUNTER — APPOINTMENT (OUTPATIENT)
Dept: ANTEPARTUM | Facility: CLINIC | Age: 30
End: 2025-07-15
Payer: MEDICAID

## 2025-07-15 ENCOUNTER — OUTPATIENT (OUTPATIENT)
Dept: OUTPATIENT SERVICES | Facility: HOSPITAL | Age: 30
LOS: 1 days | End: 2025-07-15
Payer: MEDICAID

## 2025-07-15 DIAGNOSIS — Z90.89 ACQUIRED ABSENCE OF OTHER ORGANS: Chronic | ICD-10-CM

## 2025-07-15 DIAGNOSIS — Z98.890 OTHER SPECIFIED POSTPROCEDURAL STATES: Chronic | ICD-10-CM

## 2025-07-15 DIAGNOSIS — Z34.90 ENCOUNTER FOR SUPERVISION OF NORMAL PREGNANCY, UNSPECIFIED, UNSPECIFIED TRIMESTER: ICD-10-CM

## 2025-07-15 PROCEDURE — 76813 OB US NUCHAL MEAS 1 GEST: CPT | Mod: 26

## 2025-07-15 PROCEDURE — 76813 OB US NUCHAL MEAS 1 GEST: CPT

## 2025-07-15 PROCEDURE — 76801 OB US < 14 WKS SINGLE FETUS: CPT | Mod: 26

## 2025-07-15 PROCEDURE — 76801 OB US < 14 WKS SINGLE FETUS: CPT

## 2025-07-16 DIAGNOSIS — Z36.89 ENCOUNTER FOR OTHER SPECIFIED ANTENATAL SCREENING: ICD-10-CM

## 2025-07-16 DIAGNOSIS — Z3A.12 12 WEEKS GESTATION OF PREGNANCY: ICD-10-CM

## 2025-07-16 DIAGNOSIS — O34.11 MATERNAL CARE FOR BENIGN TUMOR OF CORPUS UTERI, FIRST TRIMESTER: ICD-10-CM

## 2025-07-16 DIAGNOSIS — Z36.82 ENCOUNTER FOR ANTENATAL SCREENING FOR NUCHAL TRANSLUCENCY: ICD-10-CM

## 2025-07-16 DIAGNOSIS — O99.211 OBESITY COMPLICATING PREGNANCY, FIRST TRIMESTER: ICD-10-CM

## 2025-07-17 RX ORDER — METRONIDAZOLE 500 MG/1
500 TABLET ORAL TWICE DAILY
Qty: 14 | Refills: 0 | Status: ACTIVE | COMMUNITY
Start: 2025-07-17 | End: 1900-01-01

## 2025-07-22 ENCOUNTER — APPOINTMENT (OUTPATIENT)
Dept: OBGYN | Facility: CLINIC | Age: 30
End: 2025-07-22
Payer: MEDICAID

## 2025-07-22 ENCOUNTER — OUTPATIENT (OUTPATIENT)
Dept: OUTPATIENT SERVICES | Facility: HOSPITAL | Age: 30
LOS: 1 days | End: 2025-07-22
Payer: MEDICAID

## 2025-07-22 VITALS — BODY MASS INDEX: 31.29 KG/M2 | SYSTOLIC BLOOD PRESSURE: 116 MMHG | WEIGHT: 188 LBS | DIASTOLIC BLOOD PRESSURE: 78 MMHG

## 2025-07-22 DIAGNOSIS — Z34.90 ENCOUNTER FOR SUPERVISION OF NORMAL PREGNANCY, UNSPECIFIED, UNSPECIFIED TRIMESTER: ICD-10-CM

## 2025-07-22 DIAGNOSIS — Z90.89 ACQUIRED ABSENCE OF OTHER ORGANS: Chronic | ICD-10-CM

## 2025-07-22 DIAGNOSIS — Z98.890 OTHER SPECIFIED POSTPROCEDURAL STATES: Chronic | ICD-10-CM

## 2025-07-22 PROCEDURE — 99213 OFFICE O/P EST LOW 20 MIN: CPT

## 2025-07-22 RX ORDER — PSEUDOEPHEDRINE HCL 30 MG
27-0.8 TABLET ORAL DAILY
Qty: 120 | Refills: 1 | Status: ACTIVE | COMMUNITY
Start: 2025-07-22 | End: 1900-01-01

## 2025-07-23 DIAGNOSIS — Z34.90 ENCOUNTER FOR SUPERVISION OF NORMAL PREGNANCY, UNSPECIFIED, UNSPECIFIED TRIMESTER: ICD-10-CM

## 2025-08-17 LAB
BILIRUB UR QL STRIP: NORMAL
CLARITY UR: CLEAR
COLLECTION METHOD: NORMAL
GLUCOSE UR-MCNC: NORMAL
HCG UR QL: NORMAL EU/DL
HGB UR QL STRIP.AUTO: NORMAL
KETONES UR-MCNC: NORMAL
LEUKOCYTE ESTERASE UR QL STRIP: NORMAL
NITRITE UR QL STRIP: NORMAL
PH UR STRIP: NORMAL
PROT UR STRIP-MCNC: NORMAL
SP GR UR STRIP: NORMAL

## 2025-08-18 ENCOUNTER — NON-APPOINTMENT (OUTPATIENT)
Age: 30
End: 2025-08-18

## 2025-08-19 ENCOUNTER — APPOINTMENT (OUTPATIENT)
Dept: OBGYN | Facility: CLINIC | Age: 30
End: 2025-08-19
Payer: MEDICAID

## 2025-08-19 ENCOUNTER — OUTPATIENT (OUTPATIENT)
Dept: OUTPATIENT SERVICES | Facility: HOSPITAL | Age: 30
LOS: 1 days | End: 2025-08-19
Payer: MEDICAID

## 2025-08-19 VITALS
SYSTOLIC BLOOD PRESSURE: 117 MMHG | HEIGHT: 65 IN | BODY MASS INDEX: 33.32 KG/M2 | WEIGHT: 200 LBS | DIASTOLIC BLOOD PRESSURE: 79 MMHG

## 2025-08-19 DIAGNOSIS — O23.40 UNSPECIFIED INFECTION OF URINARY TRACT IN PREGNANCY, UNSPECIFIED TRIMESTER: ICD-10-CM

## 2025-08-19 DIAGNOSIS — Z34.90 ENCOUNTER FOR SUPERVISION OF NORMAL PREGNANCY, UNSPECIFIED, UNSPECIFIED TRIMESTER: ICD-10-CM

## 2025-08-19 DIAGNOSIS — Z90.89 ACQUIRED ABSENCE OF OTHER ORGANS: Chronic | ICD-10-CM

## 2025-08-19 DIAGNOSIS — Z98.890 OTHER SPECIFIED POSTPROCEDURAL STATES: Chronic | ICD-10-CM

## 2025-08-19 LAB
BILIRUB UR QL STRIP: NEGATIVE
CLARITY UR: CLEAR
COLLECTION METHOD: NORMAL
GLUCOSE UR-MCNC: NEGATIVE
HCG UR QL: 0.2 EU/DL
HGB UR QL STRIP.AUTO: NEGATIVE
KETONES UR-MCNC: NEGATIVE
LEUKOCYTE ESTERASE UR QL STRIP: NEGATIVE
NITRITE UR QL STRIP: NEGATIVE
PH UR STRIP: 6
PROT UR STRIP-MCNC: NEGATIVE
SP GR UR STRIP: 1.02

## 2025-08-19 PROCEDURE — 81002 URINALYSIS NONAUTO W/O SCOPE: CPT

## 2025-08-19 PROCEDURE — 99213 OFFICE O/P EST LOW 20 MIN: CPT

## 2025-08-19 RX ORDER — PSEUDOEPHEDRINE HCL 30 MG
27-0.8 TABLET ORAL
Qty: 60 | Refills: 0 | Status: ACTIVE | COMMUNITY
Start: 2025-08-19 | End: 1900-01-01

## 2025-08-19 RX ORDER — NITROFURANTOIN (MONOHYDRATE/MACROCRYSTALS) 25; 75 MG/1; MG/1
100 CAPSULE ORAL
Qty: 14 | Refills: 0 | Status: ACTIVE | COMMUNITY
Start: 2025-08-19 | End: 1900-01-01

## 2025-08-20 DIAGNOSIS — O23.40 UNSPECIFIED INFECTION OF URINARY TRACT IN PREGNANCY, UNSPECIFIED TRIMESTER: ICD-10-CM

## 2025-08-20 DIAGNOSIS — Z00.00 ENCOUNTER FOR GENERAL ADULT MEDICAL EXAMINATION WITHOUT ABNORMAL FINDINGS: ICD-10-CM

## 2025-08-20 DIAGNOSIS — Z34.92 ENCOUNTER FOR SUPERVISION OF NORMAL PREGNANCY, UNSPECIFIED, SECOND TRIMESTER: ICD-10-CM

## 2025-08-28 ENCOUNTER — NON-APPOINTMENT (OUTPATIENT)
Age: 30
End: 2025-08-28

## 2025-09-09 ENCOUNTER — RESULT CHARGE (OUTPATIENT)
Age: 30
End: 2025-09-09

## 2025-09-09 ENCOUNTER — APPOINTMENT (OUTPATIENT)
Dept: OBGYN | Facility: CLINIC | Age: 30
End: 2025-09-09
Payer: MEDICAID

## 2025-09-09 ENCOUNTER — ASOB RESULT (OUTPATIENT)
Age: 30
End: 2025-09-09

## 2025-09-09 ENCOUNTER — APPOINTMENT (OUTPATIENT)
Dept: ANTEPARTUM | Facility: CLINIC | Age: 30
End: 2025-09-09
Payer: MEDICAID

## 2025-09-09 VITALS
SYSTOLIC BLOOD PRESSURE: 125 MMHG | HEIGHT: 65 IN | DIASTOLIC BLOOD PRESSURE: 79 MMHG | BODY MASS INDEX: 33.66 KG/M2 | WEIGHT: 202 LBS

## 2025-09-09 DIAGNOSIS — Z34.90 ENCOUNTER FOR SUPERVISION OF NORMAL PREGNANCY, UNSPECIFIED, UNSPECIFIED TRIMESTER: ICD-10-CM

## 2025-09-09 PROCEDURE — 76817 TRANSVAGINAL US OBSTETRIC: CPT | Mod: 26

## 2025-09-09 PROCEDURE — 99213 OFFICE O/P EST LOW 20 MIN: CPT

## 2025-09-09 PROCEDURE — 76805 OB US >/= 14 WKS SNGL FETUS: CPT | Mod: 26

## 2025-09-10 LAB
BILIRUB UR QL STRIP: NORMAL
CLARITY UR: CLEAR
COLLECTION METHOD: NORMAL
GLUCOSE UR-MCNC: NORMAL
HCG UR QL: 0.2 EU/DL
HGB UR QL STRIP.AUTO: NORMAL
KETONES UR-MCNC: NORMAL
LEUKOCYTE ESTERASE UR QL STRIP: NORMAL
NITRITE UR QL STRIP: NORMAL
PH UR STRIP: 6
PROT UR STRIP-MCNC: NORMAL
SP GR UR STRIP: 1.02

## 2025-09-14 LAB
BASOPHILS # BLD AUTO: 0.02 K/UL
BASOPHILS NFR BLD AUTO: 0.1 %
EOSINOPHIL # BLD AUTO: 0.09 K/UL
EOSINOPHIL NFR BLD AUTO: 0.6 %
HCT VFR BLD CALC: 33.4 %
HGB BLD-MCNC: 11 G/DL
IMM GRANULOCYTES NFR BLD AUTO: 0.7 %
LYMPHOCYTES # BLD AUTO: 1.87 K/UL
LYMPHOCYTES NFR BLD AUTO: 13.1 %
MAN DIFF?: NORMAL
MCHC RBC-ENTMCNC: 30 PG
MCHC RBC-ENTMCNC: 32.9 G/DL
MCV RBC AUTO: 91 FL
MONOCYTES # BLD AUTO: 0.63 K/UL
MONOCYTES NFR BLD AUTO: 4.4 %
NEUTROPHILS # BLD AUTO: 11.61 K/UL
NEUTROPHILS NFR BLD AUTO: 81.1 %
PLATELET # BLD AUTO: 362 K/UL
PMV BLD AUTO: 0 /100 WBCS
PMV BLD: 12 FL
RBC # BLD: 3.67 M/UL
RBC # FLD: 13.2 %
WBC # FLD AUTO: 14.32 K/UL

## 2025-09-16 ENCOUNTER — APPOINTMENT (OUTPATIENT)
Dept: OBGYN | Facility: CLINIC | Age: 30
End: 2025-09-16

## 2025-09-17 LAB
2ND TRIMESTER 4 SCREEN SERPL-IMP: NO
AFP ADJ MOM SERPL: 1.45
AFP INTERP SERPL-IMP: NORMAL
AFP INTERP SERPL-IMP: NORMAL
AFP MOM CUT-OFF: 2.5
AFP PERCENTILE: 87.6
AFP SERPL-ACNC: 67.24 NG/ML
CARBAMAZEPINE?: NO
CURRENT SMOKER: NO
DIABETES STATUS PATIENT: NORMAL
GA: NORMAL
GESTATIONAL AGE METHOD: NORMAL
HX OF NTD NARR: NORMAL
MULTIPLE PREGNANCY: NORMAL
NEURAL TUBE DEFECT RISK FETUS: NORMAL
NEURAL TUBE DEFECT RISK POP: NORMAL
TEST PERFORMANCE INFO SPEC: NORMAL
VALPROIC ACID?: NORMAL

## 2025-09-19 ENCOUNTER — NON-APPOINTMENT (OUTPATIENT)
Age: 30
End: 2025-09-19

## 2025-09-19 ENCOUNTER — APPOINTMENT (OUTPATIENT)
Dept: OBGYN | Facility: CLINIC | Age: 30
End: 2025-09-19
Payer: MEDICAID

## 2025-09-19 VITALS
HEIGHT: 65 IN | DIASTOLIC BLOOD PRESSURE: 58 MMHG | BODY MASS INDEX: 34.16 KG/M2 | SYSTOLIC BLOOD PRESSURE: 112 MMHG | WEIGHT: 205 LBS

## 2025-09-19 DIAGNOSIS — O24.419 GESTATIONAL DIABETES MELLITUS IN PREGNANCY, UNSPECIFIED CONTROL: ICD-10-CM

## 2025-09-19 PROCEDURE — 99213 OFFICE O/P EST LOW 20 MIN: CPT

## 2025-09-19 RX ORDER — LANCETS 33 GAUGE
EACH MISCELLANEOUS
Qty: 120 | Refills: 4 | Status: ACTIVE | COMMUNITY
Start: 2025-09-19 | End: 1900-01-01

## 2025-09-19 RX ORDER — BLOOD-GLUCOSE METER
W/DEVICE EACH MISCELLANEOUS
Qty: 1 | Refills: 0 | Status: ACTIVE | COMMUNITY
Start: 2025-09-19 | End: 1900-01-01

## 2025-09-19 RX ORDER — BLOOD SUGAR DIAGNOSTIC
STRIP MISCELLANEOUS
Qty: 240 | Refills: 2 | Status: ACTIVE | COMMUNITY
Start: 2025-09-19 | End: 1900-01-01

## 2025-09-19 RX ORDER — ISOPROPYL ALCOHOL 70 ML/100ML
SWAB TOPICAL
Qty: 200 | Refills: 3 | Status: ACTIVE | COMMUNITY
Start: 2025-09-19 | End: 1900-01-01

## 2025-09-20 LAB
BILIRUB UR QL STRIP: NORMAL
CLARITY UR: CLEAR
COLLECTION METHOD: NORMAL
GLUCOSE 1H P 50 G GLC PO SERPL-MCNC: 259 MG/DL
GLUCOSE UR-MCNC: NORMAL
HCG UR QL: 0.2 EU/DL
HGB UR QL STRIP.AUTO: NORMAL
KETONES UR-MCNC: NORMAL
LEUKOCYTE ESTERASE UR QL STRIP: NORMAL
NITRITE UR QL STRIP: NORMAL
PH UR STRIP: 6
PROT UR STRIP-MCNC: NORMAL
SP GR UR STRIP: 1.02